# Patient Record
Sex: MALE | Race: WHITE | NOT HISPANIC OR LATINO | Employment: PART TIME | URBAN - METROPOLITAN AREA
[De-identification: names, ages, dates, MRNs, and addresses within clinical notes are randomized per-mention and may not be internally consistent; named-entity substitution may affect disease eponyms.]

---
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
## 2024-04-29 ENCOUNTER — OFFICE VISIT (OUTPATIENT)
Dept: PAIN MEDICINE | Facility: CLINIC | Age: 19
End: 2024-04-29
Payer: COMMERCIAL

## 2024-04-29 ENCOUNTER — HOSPITAL ENCOUNTER (OUTPATIENT)
Dept: RADIOLOGY | Facility: HOSPITAL | Age: 19
Discharge: HOME | End: 2024-04-29
Attending: PHYSICAL MEDICINE & REHABILITATION
Payer: COMMERCIAL

## 2024-04-29 VITALS
HEART RATE: 86 BPM | WEIGHT: 228.81 LBS | RESPIRATION RATE: 18 BRPM | BODY MASS INDEX: 34.68 KG/M2 | HEIGHT: 68 IN | DIASTOLIC BLOOD PRESSURE: 67 MMHG | SYSTOLIC BLOOD PRESSURE: 102 MMHG

## 2024-04-29 DIAGNOSIS — M79.12 MYALGIA OF AUXILIARY MUSCLES, HEAD AND NECK: Primary | ICD-10-CM

## 2024-04-29 DIAGNOSIS — M79.12 MYALGIA OF AUXILIARY MUSCLES, HEAD AND NECK: ICD-10-CM

## 2024-04-29 PROCEDURE — 1159F MED LIST DOCD IN RCRD: CPT | Mod: CPTII,S$GLB,, | Performed by: PHYSICAL MEDICINE & REHABILITATION

## 2024-04-29 PROCEDURE — 99999 PR PBB SHADOW E&M-EST. PATIENT-LVL IV: CPT | Mod: PBBFAC,,, | Performed by: PHYSICAL MEDICINE & REHABILITATION

## 2024-04-29 PROCEDURE — 3074F SYST BP LT 130 MM HG: CPT | Mod: CPTII,S$GLB,, | Performed by: PHYSICAL MEDICINE & REHABILITATION

## 2024-04-29 PROCEDURE — 99203 OFFICE O/P NEW LOW 30 MIN: CPT | Mod: S$GLB,,, | Performed by: PHYSICAL MEDICINE & REHABILITATION

## 2024-04-29 PROCEDURE — 3008F BODY MASS INDEX DOCD: CPT | Mod: CPTII,S$GLB,, | Performed by: PHYSICAL MEDICINE & REHABILITATION

## 2024-04-29 PROCEDURE — 72052 X-RAY EXAM NECK SPINE 6/>VWS: CPT | Mod: 26,,, | Performed by: RADIOLOGY

## 2024-04-29 PROCEDURE — 3078F DIAST BP <80 MM HG: CPT | Mod: CPTII,S$GLB,, | Performed by: PHYSICAL MEDICINE & REHABILITATION

## 2024-04-29 PROCEDURE — 72052 X-RAY EXAM NECK SPINE 6/>VWS: CPT | Mod: TC

## 2024-04-29 NOTE — PROGRESS NOTES
New Patient Chronic Pain Note (Initial Visit)    Referring Physician: Drea Arora    PCP: TERRELL Chaves MD    Chief Complaint: No chief complaint on file.       SUBJECTIVE:    Nahum Alfaro is a 18 y.o. male who presents to the clinic for the evaluation of neck pain.  He is self referred.  He is past medical history of autism depression ADHD obesity ADDIE multiple other medical comorbidities as listed in his chart.  The pain started 4-6 weeks ago without any injury or trauma and symptoms have been unchanged.The pain is located in the base of the cervical spine without any radiation.  The pain is described as  stiff  and is rated as 5/10. The pain is rated with a score of  5/10 on the BEST day and a score of 5/10 on the WORST day.  Symptoms interfere with daily activity. The pain is exacerbated by movement.  The pain is mitigated by nothing.  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    Patient denies night fever/night sweats, urinary incontinence, bowel incontinence, significant weight loss, significant motor weakness, and loss of sensations.    Pain Disability Index Review:          No data to display                Non-Pharmacologic Treatments:  Physical Therapy/Home Exercise: no  Ice/Heat:no  TENS: no  Acupuncture: no  Massage: no  Chiropractic: no    Other: no      Pain Medications:  - Opioids:  None recently  - Adjuvant Medications:  Wellbutrin, Paxil, ibuprofen  - Anti-Coagulants:  None     report:  Reviewed and consistent with medication use as prescribed.    Pain Procedures:   Denies      Imaging:   X-ray lumbar spine 11/03/2023:  The vertebral bodies demonstrate a normal height and alignment. The disc space heights are well maintained. No significant facet arthropathy suggested. Prominent stool noted throughout the right colon.         Past Medical History:   Diagnosis Date    ADHD (attention deficit hyperactivity disorder)     Anxiety     IBS (irritable bowel syndrome)      Past  Surgical History:   Procedure Laterality Date    TYMPANOSTOMY TUBE PLACEMENT Bilateral 2006     Social History     Socioeconomic History    Marital status: Single   Tobacco Use    Smoking status: Never     Passive exposure: Never    Smokeless tobacco: Never   Substance and Sexual Activity    Alcohol use: No    Drug use: No    Sexual activity: Not Currently     Partners: Female   Social History Narrative    4 cats, no smokers in household.     Family History   Problem Relation Name Age of Onset    No Known Problems Mother      No Known Problems Father      No Known Problems Brother         Review of patient's allergies indicates:   Allergen Reactions    Lactose Other (See Comments) and Rash     Abd pain.  Abd pain.  Abd pain.       Current Outpatient Medications   Medication Sig Dispense Refill    buPROPion (WELLBUTRIN XL) 300 MG 24 hr tablet Take 1 tablet every morning to help with depression.      cloNIDine (CATAPRES) 0.3 MG tablet Take 0.3 mg by mouth every evening.      doxycycline (VIBRA-TABS) 100 MG tablet Take 1 tablet (100 mg total) by mouth once daily. 90 tablet 0    glycopyrrolate (ROBINUL) 2 MG Tab Take 1 tablet (2 mg total) by mouth every evening. 30 tablet 5    linaCLOtide (LINZESS) 72 mcg Cap capsule Take 1 capsule (72 mcg total) by mouth before breakfast. 90 capsule 4    metFORMIN (GLUCOPHAGE-XR) 500 MG ER 24hr tablet Take 1 tablet (500 mg total) by mouth once daily for 14 days, THEN 1 tablet (500 mg total) 2 (two) times daily with meals for 14 days. 60 tablet 2    methylphenidate HCl 54 MG CR tablet Take 54 mg by mouth every morning.      mupirocin (BACTROBAN) 2 % ointment Apply topically 2 (two) times daily. 22 g 0    paroxetine (PAXIL-CR) 25 MG 24 hr tablet Take 1 tablet every evening at bedtime to help with anxiety.      sildenafiL (VIAGRA) 50 MG tablet Take 1/2 to 1 tab by mouth 30min prior sex 30 tablet 2     No current facility-administered medications for this visit.       Review of Systems      GENERAL:  No weight loss, malaise or fevers.  HEENT:   No recent changes in vision or hearing  NECK:  Negative for lumps, no difficulty with swallowing.  RESPIRATORY:  Negative for cough, wheezing or shortness of breath, patient denies any recent URI.  CARDIOVASCULAR:  Negative for chest pain, leg swelling or palpitations.  GI:  Negative for abdominal discomfort, blood in stools or black stools or change in bowel habits.  MUSCULOSKELETAL:  See HPI.  SKIN:  Negative for lesions, rash, and itching.  PSYCH:  No mood disorder or recent psychosocial stressors.  Patients sleep is not disturbed secondary to pain.  HEMATOLOGY/LYMPHOLOGY:  Negative for prolonged bleeding, bruising easily or swollen nodes.  Patient is not currently taking any anti-coagulants  NEURO:   No history of headaches, syncope, paralysis, seizures or tremors.  All other reviewed and negative other than HPI.    OBJECTIVE:    There were no vitals taken for this visit.        Physical Exam    GENERAL: Well appearing, in no acute distress, alert and oriented x3.  PSYCH:  Mood and affect appropriate.  SKIN: Skin color, texture, turgor normal, no rashes or lesions.  HEAD/FACE:  Normocephalic, atraumatic. Cranial nerves grossly intact.  NECK: No pain to palpation over the cervical paraspinous muscles. Spurling Negative.  Minimal pain with neck flexion, extension, and lateral flexion.   CV: RRR with palpation of the radial artery.  PULM: No evidence of respiratory difficulty, symmetric chest rise.  GI:  Soft and non-tender.  BACK:  Kyphotic posture.  No pain to palpation over the facet joints of the lumbar spine or spinous processes. Normal range of motion without pain reproduction.  EXTREMITIES:  No deformities, edema, or skin discoloration. Good capillary refill.  MUSCULOSKELETAL: Shoulder provocative maneuvers are negative.  Bilateral upper and lower extremity strength is normal and symmetric.  No atrophy or tone abnormalities are noted.  NEURO:  Bilateral upper and lower extremity coordination and muscle stretch reflexes are physiologic and symmetric.  Plantar response are downgoing. No clonus.  Negative Dragan.  No loss of sensation is noted.  GAIT: normal.      LABS:  Lab Results   Component Value Date    WBC 6.52 09/29/2020    HGB 13.5 09/29/2020    HCT 43.1 09/29/2020    MCV 94 09/29/2020     09/29/2020       CMP  Sodium   Date Value Ref Range Status   04/28/2023 138 136 - 145 mmol/L Final     Potassium   Date Value Ref Range Status   04/28/2023 4.2 3.5 - 5.1 mmol/L Final     Chloride   Date Value Ref Range Status   04/28/2023 105 95 - 110 mmol/L Final     CO2   Date Value Ref Range Status   04/28/2023 24 23 - 29 mmol/L Final     Glucose   Date Value Ref Range Status   04/28/2023 82 70 - 110 mg/dL Final     BUN   Date Value Ref Range Status   04/28/2023 10 6 - 20 mg/dL Final     Creatinine   Date Value Ref Range Status   04/28/2023 0.7 0.5 - 1.4 mg/dL Final     Calcium   Date Value Ref Range Status   04/28/2023 9.3 8.7 - 10.5 mg/dL Final     Total Protein   Date Value Ref Range Status   04/28/2023 7.4 6.0 - 8.4 g/dL Final     Albumin   Date Value Ref Range Status   04/28/2023 3.9 3.2 - 4.7 g/dL Final     Total Bilirubin   Date Value Ref Range Status   04/28/2023 0.2 0.1 - 1.0 mg/dL Final     Comment:     For infants and newborns, interpretation of results should be based  on gestational age, weight and in agreement with clinical  observations.    Premature Infant recommended reference ranges:  Up to 24 hours.............<8.0 mg/dL  Up to 48 hours............<12.0 mg/dL  3-5 days..................<15.0 mg/dL  6-29 days.................<15.0 mg/dL       Alkaline Phosphatase   Date Value Ref Range Status   04/28/2023 94 59 - 164 U/L Final     AST   Date Value Ref Range Status   04/28/2023 21 10 - 40 U/L Final     ALT   Date Value Ref Range Status   04/28/2023 24 10 - 44 U/L Final     Anion Gap   Date Value Ref Range Status   04/28/2023 9 8 - 16  mmol/L Final     eGFR if    Date Value Ref Range Status   01/20/2020 SEE COMMENT >60 mL/min/1.73 m^2 Final     eGFR if non    Date Value Ref Range Status   01/20/2020 SEE COMMENT >60 mL/min/1.73 m^2 Final     Comment:     Calculation used to obtain the estimated glomerular filtration  rate (eGFR) is the CKD-EPI equation.   Test not performed.  GFR calculation is only valid for patients   18 and older.         Lab Results   Component Value Date    HGBA1C 5.2 04/28/2023             ASSESSMENT: 18 y.o. year old male with neck pain, consistent with     No diagnosis found.      PLAN:   - Interventions:  None at this time    - Anticoagulation use:  None    - Medications: I have stressed the importance of physical activity and a home exercise plan to help with pain and improve health. and Patient can continue with medications for now since they are providing benefits, using them appropriately, and without side effects.         - Therapy:  Likely will place referral to Physical therapy for Cervical ROM, stretching, strengthening, and a home exercise program.  Will determine after x-ray of cervical spine    - Labs:  Reviewed    - Imaging: Reviewed available imaging with patient and answered any questions they had regarding study.Order Flexion-Extension X-rays of the cervical spine to rule out any instability.    - Consults/Referrals:  None at this time, likely physical therapy    - Records:  Reviewed/Obtain old records from outside physicians and imaging    - Follow up visit: return to clinic as needed    - Counseled patient regarding the importance of activity modification and physical therapy    - This condition does not require this patient to take time off of work, and the primary goal of our Pain Management services is to improve the patient's functional capacity.    - Patient Questions: Answered all of the patient's questions regarding diagnosis, therapy, and treatment        The above  plan and management options were discussed at length with patient. Patient is in agreement with the above and verbalized understanding.    I discussed the goals of interventional chronic pain management with the patient on today's visit.  I explained the utility of injections for diagnostic and therapeutic purposes.  We discussed a multimodal approach to pain including treating the patient's given worst pain at any given time.  We will use a systematic approach to addressing pain.  We will also adopt a multimodal approach that includes injections, adjuvant medications, physical therapy, at times psychiatry.  There may be a limited role for opioid use intermittently in the treatment of pain, more particularly for acute pain although no one approach can be used as a sole treatment modality.    I emphasized the importance of regular exercise, core strengthening and stretching, diet and weight loss as a cornerstone of long-term pain management.      Dalton Velarde MD  Interventional Pain Management  [facility]    Disclaimer:  This note was prepared using voice recognition system and is likely to have sound alike errors that may have been overlooked even after proof reading.  Please call me with any questions

## 2024-04-30 ENCOUNTER — PATIENT MESSAGE (OUTPATIENT)
Dept: PAIN MEDICINE | Facility: CLINIC | Age: 19
End: 2024-04-30
Payer: COMMERCIAL

## 2024-05-17 ENCOUNTER — OFFICE VISIT (OUTPATIENT)
Dept: INTERNAL MEDICINE | Facility: CLINIC | Age: 19
End: 2024-05-17
Payer: COMMERCIAL

## 2024-05-17 VITALS
WEIGHT: 228.63 LBS | DIASTOLIC BLOOD PRESSURE: 60 MMHG | RESPIRATION RATE: 18 BRPM | HEART RATE: 113 BPM | TEMPERATURE: 98 F | OXYGEN SATURATION: 98 % | HEIGHT: 68 IN | BODY MASS INDEX: 34.65 KG/M2 | SYSTOLIC BLOOD PRESSURE: 110 MMHG

## 2024-05-17 DIAGNOSIS — Z13.220 ENCOUNTER FOR LIPID SCREENING FOR CARDIOVASCULAR DISEASE: ICD-10-CM

## 2024-05-17 DIAGNOSIS — E66.09 CLASS 1 OBESITY DUE TO EXCESS CALORIES WITH SERIOUS COMORBIDITY AND BODY MASS INDEX (BMI) OF 34.0 TO 34.9 IN ADULT: Primary | Chronic | ICD-10-CM

## 2024-05-17 DIAGNOSIS — Z13.6 ENCOUNTER FOR LIPID SCREENING FOR CARDIOVASCULAR DISEASE: ICD-10-CM

## 2024-05-17 DIAGNOSIS — E88.819 INSULIN RESISTANCE: Chronic | ICD-10-CM

## 2024-05-17 PROCEDURE — 1160F RVW MEDS BY RX/DR IN RCRD: CPT | Mod: CPTII,S$GLB,, | Performed by: FAMILY MEDICINE

## 2024-05-17 PROCEDURE — 1159F MED LIST DOCD IN RCRD: CPT | Mod: CPTII,S$GLB,, | Performed by: FAMILY MEDICINE

## 2024-05-17 PROCEDURE — 3078F DIAST BP <80 MM HG: CPT | Mod: CPTII,S$GLB,, | Performed by: FAMILY MEDICINE

## 2024-05-17 PROCEDURE — 99214 OFFICE O/P EST MOD 30 MIN: CPT | Mod: S$GLB,,, | Performed by: FAMILY MEDICINE

## 2024-05-17 PROCEDURE — 3008F BODY MASS INDEX DOCD: CPT | Mod: CPTII,S$GLB,, | Performed by: FAMILY MEDICINE

## 2024-05-17 PROCEDURE — 99999 PR PBB SHADOW E&M-EST. PATIENT-LVL III: CPT | Mod: PBBFAC,,, | Performed by: FAMILY MEDICINE

## 2024-05-17 PROCEDURE — 3074F SYST BP LT 130 MM HG: CPT | Mod: CPTII,S$GLB,, | Performed by: FAMILY MEDICINE

## 2024-05-17 PROCEDURE — G2211 COMPLEX E/M VISIT ADD ON: HCPCS | Mod: S$GLB,,, | Performed by: FAMILY MEDICINE

## 2024-05-17 RX ORDER — METFORMIN HYDROCHLORIDE 500 MG/1
1000 TABLET, EXTENDED RELEASE ORAL 2 TIMES DAILY
Qty: 360 TABLET | Refills: 2 | Status: SHIPPED | OUTPATIENT
Start: 2024-05-17

## 2024-05-17 NOTE — ASSESSMENT & PLAN NOTE
"Wt Readings from Last 6 Encounters:   05/17/24 103.7 kg (228 lb 9.9 oz) (98%, Z= 2.01)*   04/29/24 103.8 kg (228 lb 13.4 oz) (98%, Z= 2.02)*   12/15/23 103.8 kg (228 lb 13.4 oz) (98%, Z= 2.05)*   12/08/23 103.6 kg (228 lb 4.8 oz) (98%, Z= 2.04)*   11/03/23 99.8 kg (220 lb) (97%, Z= 1.88)*   09/08/23 97.1 kg (214 lb) (96%, Z= 1.77)*     * Growth percentiles are based on CDC (Boys, 2-20 Years) data.     Estimated body mass index is 34.76 kg/m² as calculated from the following:    Height as of this encounter: 5' 8" (1.727 m).    Weight as of this encounter: 103.7 kg (228 lb 9.9 oz).    "

## 2024-05-17 NOTE — PROGRESS NOTES
OFFICE VISIT 5/17/24 10:40 AM CDT    History of Present Illness    Nahum presents today for regular checkup and to discuss weight concerns.    Nahum is frustrated at the stagnation in his weight, despite increased physical activity at work which includes going up and down stairs and manually handling tasks. His dietary habits have remained the same.    The patient acknowledges adherence to his current medications including Metformin, administered twice daily for metabolism, without any reported side effects. However, he expresses a preference for his previous ADHD medication, methylphenidate, over his current medication, ACY, deeming the previous one stronger. He plans to discuss about the formulations of methylphenidate with his pharmacist.    Nahum voiced concerns about a potential inherited insulin resistance from his mother who had diabetes, and its possible impact on his weight gain. He showed interest in undergoing a glucose tolerance test.    A recent home sleep test conducted revealed no significant indications of obstructive sleep apnea.       Review of Systems   Respiratory:  Negative for chest tightness and shortness of breath.    Cardiovascular:  Negative for chest pain.   Endocrine: Negative for polydipsia and polyuria.       Assessment & Plan     Increased the patient's Metformin dosage from 1-2 pills twice daily to improve metabolism and weight management.   Explained the function of Metformin and its role in metabolism.   Educated the patient about the body's metabolic rate and how it can adjust to match caloric intake, making weight loss challenging.   Considered taking over the patient's psychiatric medication management, pending review of the patient's psychiatric progress notes.   Requested the patient to provide the last 2 psychiatric progress notes for review.   Ordered a glucose tolerance test to check for insulin resistance, considering the patient's family history of diabetes.   Explained  "the process and purpose of this test.   Prescribed a cholesterol panel and a comprehensive metabolic panel to monitor the patient's overall health.   Advised the patient to get fasting labs done soon.   Scheduled a virtual visit in July, following the patient's appointment with the psychiatrist in June.       1. Class 1 obesity due to excess calories with serious comorbidity and body mass index (BMI) of 34.0 to 34.9 in adult  Comments:  goal 5-10% tbw   pt does not like zero sugar drinks; will change to 1 soda/day instead of 2  muscle mass lower; res train 2x/wk 20 min  Assessment & Plan:  Wt Readings from Last 6 Encounters:   05/17/24 103.7 kg (228 lb 9.9 oz) (98%, Z= 2.01)*   04/29/24 103.8 kg (228 lb 13.4 oz) (98%, Z= 2.02)*   12/15/23 103.8 kg (228 lb 13.4 oz) (98%, Z= 2.05)*   12/08/23 103.6 kg (228 lb 4.8 oz) (98%, Z= 2.04)*   11/03/23 99.8 kg (220 lb) (97%, Z= 1.88)*   09/08/23 97.1 kg (214 lb) (96%, Z= 1.77)*     * Growth percentiles are based on CDC (Boys, 2-20 Years) data.     Estimated body mass index is 34.76 kg/m² as calculated from the following:    Height as of this encounter: 5' 8" (1.727 m).    Weight as of this encounter: 103.7 kg (228 lb 9.9 oz).      Orders:  -     metFORMIN (GLUCOPHAGE-XR) 500 MG ER 24hr tablet; Take 2 tablets (1,000 mg total) by mouth 2 (two) times a day.  Dispense: 360 tablet; Refill: 2  -     Comprehensive Metabolic Panel; Future; Expected date: 05/17/2024    2. Insulin resistance  Comments:  trial metformin  Overview:  trial metformin    Orders:  -     metFORMIN (GLUCOPHAGE-XR) 500 MG ER 24hr tablet; Take 2 tablets (1,000 mg total) by mouth 2 (two) times a day.  Dispense: 360 tablet; Refill: 2  -     Glucose Tolerance 2 Hour; Future; Expected date: 05/17/2024  -     Insulin, Random; Future; Expected date: 05/17/2024    3. Encounter for lipid screening for cardiovascular disease  -     Lipid Panel; Future; Expected date: 05/17/2024    No other significant complaints or " "concerns were reported.    Today's visit involved the intricate management of episodic problem(s) and the ongoing care for the patient's serious or complex condition(s) listed above, reflecting the inherent complexity of providing longitudinal, comprehensive evaluation and management as the central hub for the patient's primary care services.  Vitals:    05/17/24 1044   BP: 110/60   BP Location: Left arm   Patient Position: Sitting   BP Method: Large (Manual)   Pulse: (!) 113   Resp: 18   Temp: 98 °F (36.7 °C)   SpO2: 98%   Weight: 103.7 kg (228 lb 9.9 oz)   Height: 5' 8" (1.727 m)   Physical Exam  Vitals reviewed.   Constitutional:       General: He is not in acute distress.     Appearance: Normal appearance. He is not ill-appearing, toxic-appearing or diaphoretic.   Cardiovascular:      Rate and Rhythm: Normal rate and regular rhythm.   Pulmonary:      Effort: Pulmonary effort is normal.   Neurological:      Mental Status: He is alert and oriented to person, place, and time. Mental status is at baseline.   Psychiatric:         Mood and Affect: Mood normal.         Behavior: Behavior normal.         Judgment: Judgment normal.       This note was generated with the assistance of ambient listening technology. Verbal consent was obtained by the patient and accompanying visitor(s) for the recording of patient appointment to facilitate this note. I attest to having reviewed and edited the generated note for accuracy, though some syntax or spelling errors may persist. Please contact the author of this note for any clarification.    Documentation entered by me for this encounter may have been done in part using speech-recognition technology. Although I have made an effort to ensure accuracy, "sound like" errors may exist and should be interpreted in context.   "

## 2024-05-21 ENCOUNTER — OFFICE VISIT (OUTPATIENT)
Dept: RHEUMATOLOGY | Facility: CLINIC | Age: 19
End: 2024-05-21
Payer: COMMERCIAL

## 2024-05-21 VITALS
HEIGHT: 68 IN | WEIGHT: 226.88 LBS | HEART RATE: 83 BPM | BODY MASS INDEX: 34.38 KG/M2 | SYSTOLIC BLOOD PRESSURE: 113 MMHG | DIASTOLIC BLOOD PRESSURE: 76 MMHG

## 2024-05-21 DIAGNOSIS — M25.631 BILATERAL STIFFNESS OF WRIST JOINTS: ICD-10-CM

## 2024-05-21 DIAGNOSIS — M25.632 BILATERAL STIFFNESS OF WRIST JOINTS: ICD-10-CM

## 2024-05-21 DIAGNOSIS — M25.60 GENERALIZED STIFFNESS: Primary | ICD-10-CM

## 2024-05-21 PROCEDURE — 3074F SYST BP LT 130 MM HG: CPT | Mod: CPTII,S$GLB,, | Performed by: PHYSICIAN ASSISTANT

## 2024-05-21 PROCEDURE — 99999 PR PBB SHADOW E&M-EST. PATIENT-LVL IV: CPT | Mod: PBBFAC,,, | Performed by: PHYSICIAN ASSISTANT

## 2024-05-21 PROCEDURE — 3008F BODY MASS INDEX DOCD: CPT | Mod: CPTII,S$GLB,, | Performed by: PHYSICIAN ASSISTANT

## 2024-05-21 PROCEDURE — 1159F MED LIST DOCD IN RCRD: CPT | Mod: CPTII,S$GLB,, | Performed by: PHYSICIAN ASSISTANT

## 2024-05-21 PROCEDURE — 3078F DIAST BP <80 MM HG: CPT | Mod: CPTII,S$GLB,, | Performed by: PHYSICIAN ASSISTANT

## 2024-05-21 PROCEDURE — 99214 OFFICE O/P EST MOD 30 MIN: CPT | Mod: S$GLB,,, | Performed by: PHYSICIAN ASSISTANT

## 2024-05-21 PROCEDURE — 1160F RVW MEDS BY RX/DR IN RCRD: CPT | Mod: CPTII,S$GLB,, | Performed by: PHYSICIAN ASSISTANT

## 2024-05-21 RX ORDER — PANTOPRAZOLE SODIUM 20 MG/1
20 TABLET, DELAYED RELEASE ORAL DAILY
Qty: 90 TABLET | Refills: 3 | Status: SHIPPED | OUTPATIENT
Start: 2024-05-21 | End: 2025-05-21

## 2024-05-21 RX ORDER — MELOXICAM 15 MG/1
15 TABLET ORAL DAILY
Qty: 30 TABLET | Refills: 1 | Status: SHIPPED | OUTPATIENT
Start: 2024-05-21

## 2024-05-21 NOTE — PROGRESS NOTES
Subjective:      Patient ID: Nahum Alfaro is a 18 y.o. male.    Chief Complaint: body stiffness    HPI   Nahum Alfaro  is a 18 y.o. male seen today for rheumatologic follow-up.  He has previously seen 1 of my colleagues for complaint of whole-body stiffness.  He had autoimmune workup done December 2022 which was essentially unrevealing.  Patient here with a family friend and his mom is on the phone available during the visit as well.  He is done multiple rounds of physical therapy over this last for years without any significant relief and muscle stiffness.  He states it is functionally limiting him.  It is progressively worsening as well.      He has severe loss of extension bilateral wrists.  He can not even lay flat.  Even crossing his fingers or trying to squat is difficult.  It is gotten progressively worse over the last 3 or 4 years.  Does not complain of any significant pain.  He also complains of tight hamstrings.  Gets a rash on his lower legs.  He was put on doxycycline for folliculitis per Dr. Black and that has improved tremendously.    In past he had calprotectin checked in [place].  That was strongly positive.  At that time he was also diagnosed with salmonella poisoning.  Stool cultures were positive.  Mom's states that symptoms started to deteriorate after that infection.  From a GI standpoint he complains of chronic GI symptoms including constipation, abdominal cramping and pain.    Patient denies fevers, chills, photosensitivity, eye pain, shortness of breath, chest pain, hematuria, blood in the stool, rash, sicca symptoms, raynauds, digital/oral/nasal ulcerations, lad, progressive weakness.  Rheumatologic systems otherwise negative.    Serologies/Labs:  Neg NICK, CCP, RF  +Calprotectin 9/2020 ([place] - stool cx +salmonella)  Current Treatment:  Na   Previous Treatment:   Physical therapy  Stretching programs    Current Outpatient Medications:     buPROPion (WELLBUTRIN XL) 300 MG 24 hr  tablet, Take 1 tablet every morning to help with depression., Disp: , Rfl:     cloNIDine (CATAPRES) 0.3 MG tablet, Take 0.3 mg by mouth every evening., Disp: , Rfl:     metFORMIN (GLUCOPHAGE-XR) 500 MG ER 24hr tablet, Take 2 tablets (1,000 mg total) by mouth 2 (two) times a day., Disp: 360 tablet, Rfl: 2    methylphenidate HCl 54 MG CR tablet, Take 54 mg by mouth every morning., Disp: , Rfl:     meloxicam (MOBIC) 15 MG tablet, Take 1 tablet (15 mg total) by mouth once daily., Disp: 30 tablet, Rfl: 1    pantoprazole (PROTONIX) 20 MG tablet, Take 1 tablet (20 mg total) by mouth once daily., Disp: 90 tablet, Rfl: 3    Past Medical History:   Diagnosis Date    ADHD (attention deficit hyperactivity disorder)     Anxiety     IBS (irritable bowel syndrome)      Family History   Problem Relation Name Age of Onset    No Known Problems Mother      No Known Problems Father      No Known Problems Brother       Social History     Socioeconomic History    Marital status: Single   Tobacco Use    Smoking status: Never     Passive exposure: Never    Smokeless tobacco: Never   Substance and Sexual Activity    Alcohol use: No    Drug use: No    Sexual activity: Not Currently     Partners: Female   Social History Narrative    4 cats, no smokers in household.     Social Determinants of Health     Financial Resource Strain: Low Risk  (5/16/2024)    Overall Financial Resource Strain (CARDIA)     Difficulty of Paying Living Expenses: Not hard at all   Food Insecurity: No Food Insecurity (5/16/2024)    Hunger Vital Sign     Worried About Running Out of Food in the Last Year: Never true     Ran Out of Food in the Last Year: Never true   Physical Activity: Insufficiently Active (5/16/2024)    Exercise Vital Sign     Days of Exercise per Week: 3 days     Minutes of Exercise per Session: 30 min   Stress: No Stress Concern Present (5/16/2024)    [employer] - Occupational Stress Questionnaire     Feeling of Stress :  "Not at all   Housing Stability: Unknown (5/16/2024)    Housing Stability Vital Sign     Unable to Pay for Housing in the Last Year: No     Review of patient's allergies indicates:   Allergen Reactions    Lactose Other (See Comments) and Rash     Abd pain.  Abd pain.  Abd pain.       Objective:   /76   Pulse 83   Ht 5' 8" (1.727 m)   Wt 102.9 kg (226 lb 13.7 oz)   BMI 34.49 kg/m²   Immunization History   Administered Date(s) Administered    COVID-19, MRNA, LN-S, PF (Pfizer) (Purple Cap) 05/24/2021, 06/14/2021, 12/22/2021, 12/22/2021    DTaP 2004, 2004, 07/11/2005, 12/17/2009    DTaP / HiB 08/29/2006    HPV 9-Valent 10/27/2015, 03/30/2016    HPV Quadrivalent 08/18/2015    Hepatitis A / Hepatitis B 04/28/2023    Hepatitis A, Pediatric/Adolescent, 2 Dose 03/29/2021    Hepatitis B 2004, 2004, 07/11/2005    Hepatitis B, Adult 2004, 2004, 07/11/2005    HiB PRP-T 2004, 07/11/2005    IPV 2004, 10/13/2005, 08/29/2006, 12/17/2009    Influenza 09/29/2020    Influenza (Flumist) - Quadrivalent - Intranasal *Preferred* (2-49 years old) 10/22/2013, 11/24/2014, 10/27/2015    Influenza - Intranasal 09/04/2009, 10/12/2010    Influenza - Intranasal - Trivalent 09/04/2009, 10/12/2010    Influenza - Quadrivalent 10/03/2016, 11/20/2017    Influenza - Quadrivalent - MDCK - PF 09/29/2020, 10/18/2022    Influenza - Quadrivalent - PF (6-35 months) 10/13/2005    Influenza - Quadrivalent - PF *Preferred* (6 months and older) 10/13/2005, 10/03/2016, 11/20/2017, 10/30/2018, 11/22/2019, 10/27/2021    Influenza - Trivalent (ADULT) 10/13/2005    Influenza - Trivalent - PF (ADULT) 10/13/2005, 09/04/2009, 10/12/2010    MMR 10/23/2007, 12/17/2009    Meningococcal Conjugate (MCV4P) 08/18/2015, 03/29/2021    Pneumococcal Conjugate - 7 Valent 2004, 07/11/2005, 10/13/2005, 08/29/2006    Tdap 08/18/2015, 03/29/2021    Varicella 10/23/2007, 12/17/2009       Physical Exam   Constitutional: He is " "oriented to person, place, and time. No distress.   HENT:   Head: Normocephalic and atraumatic.   Pulmonary/Chest: Effort normal.   Musculoskeletal:         General: No swelling or tenderness. Normal range of motion.   Neurological: He is alert and oriented to person, place, and time.   Skin: Skin is dry. Rash noted.   Psychiatric: His behavior is normal. Mood normal.   Nursing note and vitals reviewed.    No synovitis, no dactylitis, no enthesitis  No effusions of large or small joints  100% fist formation    Significant decrease wrist est bilaterally - only about 30 degrees  Tight hamstrings bilaterally  5/5 strength hips/shoulders    No results found for this or any previous visit (from the past 672 hour(s)).    No results found for: "TBGOLDPLUS"   Lab Results   Component Value Date    HEPCAB Non-reactive 04/28/2023      Imaging  I have personally reviewed images and reports as below.  I agree with the interpretation.  MRI Hand Wrist W WO Bilat_Rheumatoid  Details    Reading Physician Reading Date Result Priority   Narciso Menjivar MD  [phone] 12/31/2022 Routine     Narrative & Impression  EXAMINATION:  MRI HAND_WRIST W WO BILATERAL_RHEUMATOID ARTHRITIS     CLINICAL HISTORY:  Stiffness of right wrist, not elsewhere classified     TECHNIQUE:  Multiplanar multisequence imaging of the left hand and wrist and right hand and wrist was performed with and without the intravenous administration of 10 cc of Gadavist     COMPARISON:  Bilateral hand x-rays, 12/09/2022.     FINDINGS:  Left hand and wrist: No fracture or malalignment.  No osseous erosion.  No bone marrow edema or bone marrow enhancement.  No enhancing synovitis.  The tendons and ligaments of the wrist and hand are intact.  No soft tissue mass.  No inflammatory change in the soft tissues.     Right hand and wrist: No fracture or malalignment. No osseous erosion. No bone marrow edema or bone marrow enhancement. No enhancing synovitis. The tendons and " ligaments of the wrist and hand are intact. No soft tissue mass.  No inflammatory change in the soft tissues.     Impression:     Normal bilateral hand and wrist MRI.  No arthritic changes identified.        Electronically signed by:Narciso Menjivar MD  Date:                                            12/31/2022  Time:                                           10:50       Assessment:     1. Generalized stiffness    2. Bilateral stiffness of wrist joints        Plan:     Nahum was seen today for body stiffness.    Diagnoses and all orders for this visit:    Generalized stiffness  -     Ambulatory referral/consult to Rheumatology  -     Calprotectin, Stool; Future  -     HLA B27 Antigen; Future  -     CBC Auto Differential; Standing  -     Comprehensive Metabolic Panel; Standing  -     NICK Screen w/Reflex; Future  -     Sedimentation rate; Standing  -     C-Reactive Protein; Standing  -     Anti-Smith Antibody; Future  -     Anti Sm/RNP Antibody; Future  -     ANTI -SSA ANTIBODY; Future  -     Sjogrens syndrome-B extractable nuclear antibody; Future  -     ANTI-DNA ANTIBODY, DOUBLE-STRANDED; Future  -     meloxicam (MOBIC) 15 MG tablet; Take 1 tablet (15 mg total) by mouth once daily.  -     pantoprazole (PROTONIX) 20 MG tablet; Take 1 tablet (20 mg total) by mouth once daily.    Bilateral stiffness of wrist joints    Generalized stiffness  MRI hands/wirsts unrevealing   AI w/u unrevealing  Check ESR, CRP, NICK, MAMADOU panel today - requesting to do later this week w fasting labs  Check HLA B27 today as well  Neuro eval to r/o central cause for stiffness  Mobic + Protonix as above  All precautions advised  DC if any s/e  Constipation and abd cramping  Check Calprotectin today  Drug therapy requiring intensive monitoring for toxicity  High Risk Medication Monitoring encounter  No current medication related issues, no evidence of toxicity  I ordered labs for toxicity monitoring, have personally reviewed the findings, and  discussed them with the patient.  Pending labs will be sent via the portal  Return to clinic: 1-2 mos for lab review    No follow-ups on file.    The patient understands, chooses and consents to this plan and accepts all the risks which include but are not limited to the risks mentioned above.     Disclaimer: This note was prepared using a voice recognition system and is likely to have sound alike errors within the text.

## 2024-06-07 ENCOUNTER — LAB VISIT (OUTPATIENT)
Dept: LAB | Facility: HOSPITAL | Age: 19
End: 2024-06-07
Attending: FAMILY MEDICINE
Payer: COMMERCIAL

## 2024-06-07 DIAGNOSIS — Z13.220 ENCOUNTER FOR LIPID SCREENING FOR CARDIOVASCULAR DISEASE: ICD-10-CM

## 2024-06-07 DIAGNOSIS — Z13.6 ENCOUNTER FOR LIPID SCREENING FOR CARDIOVASCULAR DISEASE: ICD-10-CM

## 2024-06-07 DIAGNOSIS — M25.60 GENERALIZED STIFFNESS: ICD-10-CM

## 2024-06-07 DIAGNOSIS — E66.09 CLASS 1 OBESITY DUE TO EXCESS CALORIES WITH SERIOUS COMORBIDITY AND BODY MASS INDEX (BMI) OF 34.0 TO 34.9 IN ADULT: Chronic | ICD-10-CM

## 2024-06-07 DIAGNOSIS — E88.819 INSULIN RESISTANCE: Chronic | ICD-10-CM

## 2024-06-07 LAB
ALBUMIN SERPL BCP-MCNC: 3.6 G/DL (ref 3.5–5.2)
ALBUMIN SERPL BCP-MCNC: 3.6 G/DL (ref 3.5–5.2)
ALP SERPL-CCNC: 93 U/L (ref 55–135)
ALP SERPL-CCNC: 93 U/L (ref 55–135)
ALT SERPL W/O P-5'-P-CCNC: 31 U/L (ref 10–44)
ALT SERPL W/O P-5'-P-CCNC: 31 U/L (ref 10–44)
ANION GAP SERPL CALC-SCNC: 8 MMOL/L (ref 8–16)
ANION GAP SERPL CALC-SCNC: 8 MMOL/L (ref 8–16)
AST SERPL-CCNC: 18 U/L (ref 10–40)
AST SERPL-CCNC: 18 U/L (ref 10–40)
BASOPHILS # BLD AUTO: 0.07 K/UL (ref 0–0.2)
BASOPHILS NFR BLD: 0.8 % (ref 0–1.9)
BILIRUB SERPL-MCNC: 0.4 MG/DL (ref 0.1–1)
BILIRUB SERPL-MCNC: 0.4 MG/DL (ref 0.1–1)
BUN SERPL-MCNC: 10 MG/DL (ref 6–20)
BUN SERPL-MCNC: 10 MG/DL (ref 6–20)
CALCIUM SERPL-MCNC: 9.1 MG/DL (ref 8.7–10.5)
CALCIUM SERPL-MCNC: 9.1 MG/DL (ref 8.7–10.5)
CHLORIDE SERPL-SCNC: 105 MMOL/L (ref 95–110)
CHLORIDE SERPL-SCNC: 105 MMOL/L (ref 95–110)
CHOLEST SERPL-MCNC: 178 MG/DL (ref 120–199)
CHOLEST/HDLC SERPL: 5.1 {RATIO} (ref 2–5)
CO2 SERPL-SCNC: 25 MMOL/L (ref 23–29)
CO2 SERPL-SCNC: 25 MMOL/L (ref 23–29)
CREAT SERPL-MCNC: 0.7 MG/DL (ref 0.5–1.4)
CREAT SERPL-MCNC: 0.7 MG/DL (ref 0.5–1.4)
CRP SERPL-MCNC: 14.3 MG/L (ref 0–8.2)
DIFFERENTIAL METHOD BLD: NORMAL
EOSINOPHIL # BLD AUTO: 0.1 K/UL (ref 0–0.5)
EOSINOPHIL NFR BLD: 0.7 % (ref 0–8)
ERYTHROCYTE [DISTWIDTH] IN BLOOD BY AUTOMATED COUNT: 13 % (ref 11.5–14.5)
ERYTHROCYTE [SEDIMENTATION RATE] IN BLOOD BY WESTERGREN METHOD: 17 MM/HR (ref 0–10)
EST. GFR  (NO RACE VARIABLE): >60 ML/MIN/1.73 M^2
EST. GFR  (NO RACE VARIABLE): >60 ML/MIN/1.73 M^2
GLUCOSE SERPL-MCNC: 101 MG/DL (ref 70–110)
GLUCOSE SERPL-MCNC: 101 MG/DL (ref 70–110)
GLUCOSE SERPL-MCNC: 106 MG/DL (ref 70–110)
GLUCOSE SERPL-MCNC: 120 MG/DL
GLUCOSE SERPL-MCNC: 206 MG/DL
HCT VFR BLD AUTO: 42.1 % (ref 40–54)
HDLC SERPL-MCNC: 35 MG/DL (ref 40–75)
HDLC SERPL: 19.7 % (ref 20–50)
HGB BLD-MCNC: 14 G/DL (ref 14–18)
IMM GRANULOCYTES # BLD AUTO: 0.02 K/UL (ref 0–0.04)
IMM GRANULOCYTES NFR BLD AUTO: 0.2 % (ref 0–0.5)
INSULIN COLLECTION INTERVAL: ABNORMAL
INSULIN SERPL-ACNC: 363.8 UU/ML
LDLC SERPL CALC-MCNC: 132 MG/DL (ref 63–159)
LYMPHOCYTES # BLD AUTO: 2.7 K/UL (ref 1–4.8)
LYMPHOCYTES NFR BLD: 31 % (ref 18–48)
MCH RBC QN AUTO: 29.9 PG (ref 27–31)
MCHC RBC AUTO-ENTMCNC: 33.3 G/DL (ref 32–36)
MCV RBC AUTO: 90 FL (ref 82–98)
MONOCYTES # BLD AUTO: 0.5 K/UL (ref 0.3–1)
MONOCYTES NFR BLD: 5.9 % (ref 4–15)
NEUTROPHILS # BLD AUTO: 5.3 K/UL (ref 1.8–7.7)
NEUTROPHILS NFR BLD: 61.4 % (ref 38–73)
NONHDLC SERPL-MCNC: 143 MG/DL
NRBC BLD-RTO: 0 /100 WBC
PLATELET # BLD AUTO: 334 K/UL (ref 150–450)
PMV BLD AUTO: 9.4 FL (ref 9.2–12.9)
POTASSIUM SERPL-SCNC: 4 MMOL/L (ref 3.5–5.1)
POTASSIUM SERPL-SCNC: 4 MMOL/L (ref 3.5–5.1)
PROT SERPL-MCNC: 7.2 G/DL (ref 6–8.4)
PROT SERPL-MCNC: 7.2 G/DL (ref 6–8.4)
RBC # BLD AUTO: 4.69 M/UL (ref 4.6–6.2)
SODIUM SERPL-SCNC: 138 MMOL/L (ref 136–145)
SODIUM SERPL-SCNC: 138 MMOL/L (ref 136–145)
TRIGL SERPL-MCNC: 55 MG/DL (ref 30–150)
WBC # BLD AUTO: 8.68 K/UL (ref 3.9–12.7)

## 2024-06-07 PROCEDURE — 86235 NUCLEAR ANTIGEN ANTIBODY: CPT | Mod: 59 | Performed by: PHYSICIAN ASSISTANT

## 2024-06-07 PROCEDURE — 36415 COLL VENOUS BLD VENIPUNCTURE: CPT | Performed by: PHYSICIAN ASSISTANT

## 2024-06-07 PROCEDURE — 86038 ANTINUCLEAR ANTIBODIES: CPT | Performed by: PHYSICIAN ASSISTANT

## 2024-06-07 PROCEDURE — 80061 LIPID PANEL: CPT | Performed by: FAMILY MEDICINE

## 2024-06-07 PROCEDURE — 86235 NUCLEAR ANTIGEN ANTIBODY: CPT | Performed by: PHYSICIAN ASSISTANT

## 2024-06-07 PROCEDURE — 83525 ASSAY OF INSULIN: CPT | Performed by: FAMILY MEDICINE

## 2024-06-07 PROCEDURE — 80053 COMPREHEN METABOLIC PANEL: CPT | Performed by: FAMILY MEDICINE

## 2024-06-07 PROCEDURE — 36415 COLL VENOUS BLD VENIPUNCTURE: CPT | Performed by: FAMILY MEDICINE

## 2024-06-07 PROCEDURE — 82951 GLUCOSE TOLERANCE TEST (GTT): CPT | Performed by: FAMILY MEDICINE

## 2024-06-07 PROCEDURE — 85025 COMPLETE CBC W/AUTO DIFF WBC: CPT | Performed by: PHYSICIAN ASSISTANT

## 2024-06-07 PROCEDURE — 85651 RBC SED RATE NONAUTOMATED: CPT | Performed by: PHYSICIAN ASSISTANT

## 2024-06-07 PROCEDURE — 86225 DNA ANTIBODY NATIVE: CPT | Performed by: PHYSICIAN ASSISTANT

## 2024-06-07 PROCEDURE — 86140 C-REACTIVE PROTEIN: CPT | Performed by: PHYSICIAN ASSISTANT

## 2024-06-07 PROCEDURE — 83993 ASSAY FOR CALPROTECTIN FECAL: CPT | Performed by: PHYSICIAN ASSISTANT

## 2024-06-10 LAB
ANA SER QL IF: NORMAL
CALPROTECTIN STL-MCNT: 10.6 MCG/G
DSDNA AB SER-ACNC: NORMAL [IU]/ML

## 2024-06-11 ENCOUNTER — PATIENT MESSAGE (OUTPATIENT)
Dept: INTERNAL MEDICINE | Facility: CLINIC | Age: 19
End: 2024-06-11
Payer: COMMERCIAL

## 2024-06-11 ENCOUNTER — PATIENT MESSAGE (OUTPATIENT)
Dept: RHEUMATOLOGY | Facility: CLINIC | Age: 19
End: 2024-06-11
Payer: COMMERCIAL

## 2024-06-11 LAB
ANTI SM ANTIBODY: 0.08 RATIO (ref 0–0.99)
ANTI SM/RNP ANTIBODY: 0.08 RATIO (ref 0–0.99)
ANTI-SM INTERPRETATION: NEGATIVE
ANTI-SM/RNP INTERPRETATION: NEGATIVE
ANTI-SSA ANTIBODY: 0.07 RATIO (ref 0–0.99)
ANTI-SSA INTERPRETATION: NEGATIVE
ANTI-SSB ANTIBODY: 0.06 RATIO (ref 0–0.99)
ANTI-SSB INTERPRETATION: NEGATIVE

## 2024-06-18 NOTE — PROGRESS NOTES
Your results are consistent with prediabetes (insulin resistance). Will discuss your results in more detail at your upcoming appointment with me on Friday, July 12th at 7:20 AM.

## 2024-06-28 ENCOUNTER — OFFICE VISIT (OUTPATIENT)
Dept: RHEUMATOLOGY | Facility: CLINIC | Age: 19
End: 2024-06-28
Payer: COMMERCIAL

## 2024-06-28 ENCOUNTER — CLINICAL SUPPORT (OUTPATIENT)
Dept: NUTRITION | Facility: CLINIC | Age: 19
End: 2024-06-28
Payer: COMMERCIAL

## 2024-06-28 VITALS
WEIGHT: 234.13 LBS | HEART RATE: 83 BPM | BODY MASS INDEX: 35.48 KG/M2 | HEIGHT: 68 IN | DIASTOLIC BLOOD PRESSURE: 64 MMHG | SYSTOLIC BLOOD PRESSURE: 102 MMHG

## 2024-06-28 DIAGNOSIS — M25.60 GENERALIZED STIFFNESS: Primary | ICD-10-CM

## 2024-06-28 DIAGNOSIS — E88.819 INSULIN RESISTANCE: ICD-10-CM

## 2024-06-28 DIAGNOSIS — M25.632 BILATERAL STIFFNESS OF WRIST JOINTS: ICD-10-CM

## 2024-06-28 DIAGNOSIS — E66.09 CLASS 1 OBESITY DUE TO EXCESS CALORIES WITH SERIOUS COMORBIDITY AND BODY MASS INDEX (BMI) OF 34.0 TO 34.9 IN ADULT: ICD-10-CM

## 2024-06-28 DIAGNOSIS — M25.631 BILATERAL STIFFNESS OF WRIST JOINTS: ICD-10-CM

## 2024-06-28 PROCEDURE — 97802 MEDICAL NUTRITION INDIV IN: CPT | Mod: 95,,, | Performed by: DIETITIAN, REGISTERED

## 2024-06-28 PROCEDURE — 99999 PR PBB SHADOW E&M-EST. PATIENT-LVL III: CPT | Mod: PBBFAC,,, | Performed by: STUDENT IN AN ORGANIZED HEALTH CARE EDUCATION/TRAINING PROGRAM

## 2024-06-28 NOTE — PROGRESS NOTES
Subjective:      Patient ID: Nahum Alfaro is a 19 y.o. male.    Chief Complaint: generalized stiffness    HPI   Nahum Alfaro  is a 19 y.o. male seen today for rheumatologic follow-up.  He has previously seen 1 of my colleagues for complaint of whole-body stiffness.  He had autoimmune workup done December 2022 which was essentially unrevealing.  Patient here with a family friend and his mom is on the phone available during the visit as well.  He is done multiple rounds of physical therapy over this last for years without any significant relief and muscle stiffness.  He states it is functionally limiting him.  It is progressively worsening as well.      He has severe loss of extension bilateral wrists.  He can not even lay flat.  Even crossing his fingers or trying to squat is difficult.  It is gotten progressively worse over the last 3 or 4 years.  Does not complain of any significant pain.  He also complains of tight hamstrings.  Gets a rash on his lower legs.  He was put on doxycycline for folliculitis per Dr. Black and that has improved tremendously.    In past he had calprotectin checked in [place].  That was strongly positive.  At that time he was also diagnosed with salmonella poisoning.  Stool cultures were positive.  Mom's states that symptoms started to deteriorate after that infection.  From a GI standpoint he complains of chronic GI symptoms including constipation, abdominal cramping and pain.    Patient denies fevers, chills, photosensitivity, eye pain, shortness of breath, chest pain, hematuria, blood in the stool, rash, sicca symptoms, raynauds, digital/oral/nasal ulcerations, lad, progressive weakness.  Rheumatologic systems otherwise negative.    Update:  Here to follow-up on labs.  Will be seeing Neuro in July.  Has gained weight.    Serologies/Labs:  Neg NICK, CCP, RF  +Calprotectin 9/2020 ([place] - stool cx +salmonella)  Current Treatment:  Na   Previous Treatment:   Physical  therapy  Stretching programs    Current Outpatient Medications:     buPROPion (WELLBUTRIN XL) 300 MG 24 hr tablet, Take 1 tablet every morning to help with depression., Disp: , Rfl:     cloNIDine (CATAPRES) 0.3 MG tablet, Take 0.3 mg by mouth every evening., Disp: , Rfl:     meloxicam (MOBIC) 15 MG tablet, Take 1 tablet (15 mg total) by mouth once daily., Disp: 30 tablet, Rfl: 1    metFORMIN (GLUCOPHAGE-XR) 500 MG ER 24hr tablet, Take 2 tablets (1,000 mg total) by mouth 2 (two) times a day., Disp: 360 tablet, Rfl: 2    methylphenidate HCl 54 MG CR tablet, Take 54 mg by mouth every morning., Disp: , Rfl:     pantoprazole (PROTONIX) 20 MG tablet, Take 1 tablet (20 mg total) by mouth once daily., Disp: 90 tablet, Rfl: 3    Past Medical History:   Diagnosis Date    ADHD (attention deficit hyperactivity disorder)     Anxiety     IBS (irritable bowel syndrome)      Family History   Problem Relation Name Age of Onset    No Known Problems Mother      No Known Problems Father      No Known Problems Brother       Social History     Socioeconomic History    Marital status: Single   Tobacco Use    Smoking status: Never     Passive exposure: Never    Smokeless tobacco: Never   Substance and Sexual Activity    Alcohol use: No    Drug use: No    Sexual activity: Not Currently     Partners: Female   Social History Narrative    4 cats, no smokers in household.     Social Determinants of Health     Financial Resource Strain: Low Risk  (5/16/2024)    Overall Financial Resource Strain (CARDIA)     Difficulty of Paying Living Expenses: Not hard at all   Food Insecurity: No Food Insecurity (5/16/2024)    Hunger Vital Sign     Worried About Running Out of Food in the Last Year: Never true     Ran Out of Food in the Last Year: Never true   Physical Activity: Insufficiently Active (5/16/2024)    Exercise Vital Sign     Days of Exercise per Week: 3 days     Minutes of Exercise per Session: 30 min   Stress: No Stress Concern Present  "(5/16/2024)    [employer] - Occupational Stress Questionnaire     Feeling of Stress : Not at all   Housing Stability: Unknown (5/16/2024)    Housing Stability Vital Sign     Unable to Pay for Housing in the Last Year: No     Review of patient's allergies indicates:   Allergen Reactions    Lactose Other (See Comments) and Rash     Abd pain.  Abd pain.  Abd pain.       Objective:   /64   Pulse 83   Ht 5' 8" (1.727 m)   Wt 106.2 kg (234 lb 2.1 oz)   BMI 35.60 kg/m²   Immunization History   Administered Date(s) Administered    COVID-19, MRNA, LN-S, PF (Pfizer) (Purple Cap) 05/24/2021, 06/14/2021, 12/22/2021, 12/22/2021    DTaP 2004, 2004, 07/11/2005, 12/17/2009    DTaP / HiB 08/29/2006    HPV 9-Valent 10/27/2015, 03/30/2016    HPV Quadrivalent 08/18/2015    Hepatitis A / Hepatitis B 04/28/2023    Hepatitis A, Pediatric/Adolescent, 2 Dose 03/29/2021    Hepatitis B 2004, 2004, 07/11/2005    Hepatitis B, Adult 2004, 2004, 07/11/2005    HiB PRP-T 2004, 07/11/2005    IPV 2004, 10/13/2005, 08/29/2006, 12/17/2009    Influenza 09/29/2020    Influenza (Flumist) - Quadrivalent - Intranasal *Preferred* (2-49 years old) 10/22/2013, 11/24/2014, 10/27/2015    Influenza - Intranasal 09/04/2009, 10/12/2010    Influenza - Intranasal - Trivalent 09/04/2009, 10/12/2010    Influenza - Quadrivalent 10/03/2016, 11/20/2017    Influenza - Quadrivalent - MDCK - PF 09/29/2020, 10/18/2022    Influenza - Quadrivalent - PF (6-35 months) 10/13/2005    Influenza - Quadrivalent - PF *Preferred* (6 months and older) 10/13/2005, 10/03/2016, 11/20/2017, 10/30/2018, 11/22/2019, 10/27/2021    Influenza - Trivalent (ADULT) 10/13/2005    Influenza - Trivalent - PF (ADULT) 10/13/2005, 09/04/2009, 10/12/2010    MMR 10/23/2007, 12/17/2009    Meningococcal Conjugate (MCV4P) 08/18/2015, 03/29/2021    Pneumococcal Conjugate - 7 Valent 2004, 07/11/2005, 10/13/2005, " 08/29/2006    Tdap 08/18/2015, 03/29/2021    Varicella 10/23/2007, 12/17/2009       Physical Exam   Constitutional: He is oriented to person, place, and time. No distress.   HENT:   Head: Normocephalic and atraumatic.   Pulmonary/Chest: Effort normal.   Musculoskeletal:         General: No swelling or tenderness. Normal range of motion.   Neurological: He is alert and oriented to person, place, and time.   Skin: Skin is dry. Rash noted.   Psychiatric: His behavior is normal. Mood normal.   Nursing note and vitals reviewed.    No synovitis, no dactylitis, no enthesitis  No effusions of large or small joints  100% fist formation    Significant decrease wrist est bilaterally - only about 30 degrees  Tight hamstrings bilaterally  5/5 strength hips/shoulders    Recent Results (from the past 672 hour(s))   Calprotectin, Stool    Collection Time: 06/07/24  7:48 AM   Result Value Ref Range    Calprotectin 10.6 <50 mcg/g   Glucose Tolerance 2 Hour    Collection Time: 06/07/24  8:04 AM   Result Value Ref Range    Gluc Fast 106 70 - 110 mg/dL    Gluc 1  mg/dL    Gluc 2  mg/dL   Insulin, Random    Collection Time: 06/07/24  8:04 AM   Result Value Ref Range    Insulin 363.8 (H) <25.0 uU/mL    Insulin Collection Interval unk    Lipid Panel    Collection Time: 06/07/24  8:04 AM   Result Value Ref Range    Cholesterol 178 120 - 199 mg/dL    Triglycerides 55 30 - 150 mg/dL    HDL 35 (L) 40 - 75 mg/dL    LDL Cholesterol 132.0 63.0 - 159.0 mg/dL    HDL/Cholesterol Ratio 19.7 (L) 20.0 - 50.0 %    Total Cholesterol/HDL Ratio 5.1 (H) 2.0 - 5.0    Non-HDL Cholesterol 143 mg/dL   Comprehensive Metabolic Panel    Collection Time: 06/07/24  8:04 AM   Result Value Ref Range    Sodium 138 136 - 145 mmol/L    Potassium 4.0 3.5 - 5.1 mmol/L    Chloride 105 95 - 110 mmol/L    CO2 25 23 - 29 mmol/L    Glucose 101 70 - 110 mg/dL    BUN 10 6 - 20 mg/dL    Creatinine 0.7 0.5 - 1.4 mg/dL    Calcium 9.1 8.7 - 10.5 mg/dL    Total Protein 7.2  6.0 - 8.4 g/dL    Albumin 3.6 3.5 - 5.2 g/dL    Total Bilirubin 0.4 0.1 - 1.0 mg/dL    Alkaline Phosphatase 93 55 - 135 U/L    AST 18 10 - 40 U/L    ALT 31 10 - 44 U/L    eGFR >60.0 >60 mL/min/1.73 m^2    Anion Gap 8 8 - 16 mmol/L   CBC Auto Differential    Collection Time: 06/07/24  8:04 AM   Result Value Ref Range    WBC 8.68 3.90 - 12.70 K/uL    RBC 4.69 4.60 - 6.20 M/uL    Hemoglobin 14.0 14.0 - 18.0 g/dL    Hematocrit 42.1 40.0 - 54.0 %    MCV 90 82 - 98 fL    MCH 29.9 27.0 - 31.0 pg    MCHC 33.3 32.0 - 36.0 g/dL    RDW 13.0 11.5 - 14.5 %    Platelets 334 150 - 450 K/uL    MPV 9.4 9.2 - 12.9 fL    Immature Granulocytes 0.2 0.0 - 0.5 %    Gran # (ANC) 5.3 1.8 - 7.7 K/uL    Immature Grans (Abs) 0.02 0.00 - 0.04 K/uL    Lymph # 2.7 1.0 - 4.8 K/uL    Mono # 0.5 0.3 - 1.0 K/uL    Eos # 0.1 0.0 - 0.5 K/uL    Baso # 0.07 0.00 - 0.20 K/uL    nRBC 0 0 /100 WBC    Gran % 61.4 38.0 - 73.0 %    Lymph % 31.0 18.0 - 48.0 %    Mono % 5.9 4.0 - 15.0 %    Eosinophil % 0.7 0.0 - 8.0 %    Basophil % 0.8 0.0 - 1.9 %    Differential Method Automated    Comprehensive Metabolic Panel    Collection Time: 06/07/24  8:04 AM   Result Value Ref Range    Sodium 138 136 - 145 mmol/L    Potassium 4.0 3.5 - 5.1 mmol/L    Chloride 105 95 - 110 mmol/L    CO2 25 23 - 29 mmol/L    Glucose 101 70 - 110 mg/dL    BUN 10 6 - 20 mg/dL    Creatinine 0.7 0.5 - 1.4 mg/dL    Calcium 9.1 8.7 - 10.5 mg/dL    Total Protein 7.2 6.0 - 8.4 g/dL    Albumin 3.6 3.5 - 5.2 g/dL    Total Bilirubin 0.4 0.1 - 1.0 mg/dL    Alkaline Phosphatase 93 55 - 135 U/L    AST 18 10 - 40 U/L    ALT 31 10 - 44 U/L    eGFR >60.0 >60 mL/min/1.73 m^2    Anion Gap 8 8 - 16 mmol/L   NICK Screen w/Reflex    Collection Time: 06/07/24  8:04 AM   Result Value Ref Range    NICK Screen Negative <1:80 Negative <1:80   Sedimentation rate    Collection Time: 06/07/24  8:04 AM   Result Value Ref Range    Sed Rate 17 (H) 0 - 10 mm/Hr   C-Reactive Protein    Collection Time: 06/07/24  8:04 AM  "  Result Value Ref Range    CRP 14.3 (H) 0.0 - 8.2 mg/L   Anti-Smith Antibody    Collection Time: 06/07/24  8:04 AM   Result Value Ref Range    Anti Sm Antibody 0.08 0.00 - 0.99 Ratio    Anti-Sm Interpretation Negative Negative   Anti Sm/RNP Antibody    Collection Time: 06/07/24  8:04 AM   Result Value Ref Range    Anti Sm/RNP Antibody 0.08 0.00 - 0.99 Ratio    Anti-Sm/RNP Interpretation Negative Negative   ANTI -SSA ANTIBODY    Collection Time: 06/07/24  8:04 AM   Result Value Ref Range    Anti-SSA Antibody 0.07 0.00 - 0.99 Ratio    Anti-SSA Interpretation Negative Negative   Sjogrens syndrome-B extractable nuclear antibody    Collection Time: 06/07/24  8:04 AM   Result Value Ref Range    Anti-SSB Antibody 0.06 0.00 - 0.99 Ratio    Anti-SSB Interpretation Negative Negative   ANTI-DNA ANTIBODY, DOUBLE-STRANDED    Collection Time: 06/07/24  8:04 AM   Result Value Ref Range    ds DNA Ab Negative 1:10 Negative 1:10       No results found for: "TBGOLDPLUS"   Lab Results   Component Value Date    HEPCAB Non-reactive 04/28/2023      Imaging  I have personally reviewed images and reports as below.  I agree with the interpretation.  MRI Hand Wrist W WO Bilat_Rheumatoid  Details    Reading Physician Reading Date Result Priority   Narciso Menjivar MD  [phone] 12/31/2022 Routine     Narrative & Impression  EXAMINATION:  MRI HAND_WRIST W WO BILATERAL_RHEUMATOID ARTHRITIS     CLINICAL HISTORY:  Stiffness of right wrist, not elsewhere classified     TECHNIQUE:  Multiplanar multisequence imaging of the left hand and wrist and right hand and wrist was performed with and without the intravenous administration of 10 cc of Gadavist     COMPARISON:  Bilateral hand x-rays, 12/09/2022.     FINDINGS:  Left hand and wrist: No fracture or malalignment.  No osseous erosion.  No bone marrow edema or bone marrow enhancement.  No enhancing synovitis.  The tendons and ligaments of the wrist and hand are intact.  No soft tissue mass.  No " inflammatory change in the soft tissues.     Right hand and wrist: No fracture or malalignment. No osseous erosion. No bone marrow edema or bone marrow enhancement. No enhancing synovitis. The tendons and ligaments of the wrist and hand are intact. No soft tissue mass.  No inflammatory change in the soft tissues.     Impression:     Normal bilateral hand and wrist MRI.  No arthritic changes identified.        Electronically signed by:Narciso Menjivar MD  Date:                                            12/31/2022  Time:                                           10:50       Assessment:     1. Generalized stiffness    2. Bilateral stiffness of wrist joints          Plan:     Nahum was seen today for generalized stiffness.    Diagnoses and all orders for this visit:    Generalized stiffness    Bilateral stiffness of wrist joints      Generalized stiffness  MRI hands/wirsts unrevealing   AI w/u unrevealing  Neuro eval to r/o central cause for stiffness  Mobic + Protonix as above  All precautions advised  DC if any s/e  Drug therapy requiring intensive monitoring for toxicity  High Risk Medication Monitoring encounter  No current medication related issues, no evidence of toxicity  I ordered labs for toxicity monitoring, have personally reviewed the findings, and discussed them with the patient.  Pending labs will be sent via the portal  Return to clinic: PRN    No follow-ups on file.    The patient understands, chooses and consents to this plan and accepts all the risks which include but are not limited to the risks mentioned above.     Disclaimer: This note was prepared using a voice recognition system and is likely to have sound alike errors within the text.

## 2024-06-28 NOTE — PROGRESS NOTES
"The patient location is: pt home, in [place]  The chief complaint leading to consultation is: desired weight loss    Visit type: audiovisual    Face to Face time with patient: 90 minutes  120 minutes of total time spent on the encounter, which includes face to face time and non-face to face time preparing to see the patient (eg, review of tests), Obtaining and/or reviewing separately obtained history, Documenting clinical information in the electronic or other health record, Independently interpreting results (not separately reported) and communicating results to the patient/family/caregiver, or Care coordination (not separately reported).         Each patient to whom he or she provides medical services by telemedicine is:  (1) informed of the relationship between the physician and patient and the respective role of any other health care provider with respect to management of the patient; and (2) notified that he or she may decline to receive medical services by telemedicine and may withdraw from such care at any time.    Notes:   Nutrition Assessment    Visit Type: Insurance initial  Session Time:  2 Hours  Reason for MNT visit: Pt in for education and nutrition counseling regarding  desired weight loss .       Age: 19 y.o.  Wt:   Wt Readings from Last 1 Encounters:   05/21/24 102.9 kg (226 lb 13.7 oz)     Ht:   Ht Readings from Last 1 Encounters:   05/21/24 5' 8" (1.727 m) (28%, Z= -0.58)*     * Growth percentiles are based on CDC (Boys, 2-20 Years) data.     BMI:   BMI Readings from Last 1 Encounters:   05/21/24 34.49 kg/m² (97%, Z= 1.89)*     * Growth percentiles are based on CDC (Boys, 2-20 Years) data.       Client states:  Pt stated that he would like to lose 40-50 lbs, He was 180 lbs -- 3-4 years ago, when he was sick -- unintentional 70 lbs over 6 months. Pt reports that he would like to get back down to 180 lbs in a healthful way.    Medical History  Problem List             Resolved    Hamstring " tightness of both lower extremities (Chronic)         Osgood-Schlatter's disease         Lactose intolerance         Tachycardia         Recurrent major depressive disorder in partial remission (Chronic)         Class 1 obesity due to excess calories with serious comorbidity and body mass index (BMI) of 34.0 to 34.9 in adult (Chronic)         Generalized hyperhidrosis         Autism spectrum disorder, high-functioning (Chronic)         Generalized anxiety disorder (Chronic)         Decreased range of motion of both wrists         Intrinsic muscle tightness         Obstructive sleep apnea syndrome (Chronic)         Weight gain         Attention deficit hyperactivity disorder (ADHD), combined type (Chronic)         Insulin resistance            Past Medical History:   Diagnosis Date    ADHD (attention deficit hyperactivity disorder)     Anxiety     IBS (irritable bowel syndrome)        Past Surgical History:   Procedure Laterality Date    TYMPANOSTOMY TUBE PLACEMENT Bilateral 2006          Medications    Prior to Admission medications    Medication Sig Start Date End Date Taking? Authorizing Provider   buPROPion (WELLBUTRIN XL) 300 MG 24 hr tablet Take 1 tablet every morning to help with depression. 7/8/20   Provider, Historical   cloNIDine (CATAPRES) 0.3 MG tablet Take 0.3 mg by mouth every evening. 10/6/20   Provider, Historical   meloxicam (MOBIC) 15 MG tablet Take 1 tablet (15 mg total) by mouth once daily. 5/21/24   Julia Kidd PA-C   metFORMIN (GLUCOPHAGE-XR) 500 MG ER 24hr tablet Take 2 tablets (1,000 mg total) by mouth 2 (two) times a day. 5/17/24   TERRELL Chaves MD   methylphenidate HCl 54 MG CR tablet Take 54 mg by mouth every morning.    Provider, Historical   pantoprazole (PROTONIX) 20 MG tablet Take 1 tablet (20 mg total) by mouth once daily. 5/21/24 5/21/25  Julia Kidd PA-C        Vitamins, Minerals, and/or Supplements:  None     Food Allergies or Intolerances:  Lactose  intolerant      Social History    Marital status:  Single    Social History     Tobacco Use    Smoking status: Never     Passive exposure: Never    Smokeless tobacco: Never   Substance Use Topics    Alcohol use: No     Current Alcohol use: On vacation or holidays (hasn't drank since July 2023), if he is on vacation, he may have 5-6 drinks at a time    Lab Reports:  Reviewed and noted     Lab Results   Component Value Date    TSH 1.652 04/28/2023    FREET4 1.07 09/29/2020    CRP 14.3 (H) 06/07/2024    SEDRATE 17 (H) 06/07/2024    AST 18 06/07/2024    AST 18 06/07/2024    ALT 31 06/07/2024    ALT 31 06/07/2024    HDL 35 (L) 06/07/2024    LDLCALC 132.0 06/07/2024    TRIG 55 06/07/2024    HGBA1C 5.2 04/28/2023         BP Readings from Last 1 Encounters:   05/21/24 113/76        24-hour Recall:   Typical day:    Work varies with energy expenditure    Wake- 3:30am/ 10 am   5:30 work starts- off at 4pm go home or to school  4am-4:30am BF- Protein shake- Equate brands- 30 g protein / sometimes eating something else with it, most of the time it is just the shake   **recommended 5am BF ** add strawberries to protein shake   9 am Snack- sometimes if needed / shop at work - chips/ powerade or body armor    **Recommend body armor lyte-- Plain Greek yogurt + berries + jeremiah seeds + almonds/ chopped peanuts   11:00-11:30am Lunch- premade meals- Frozen meals: hunger yolette meals- meat or chicken + corn or mashed potatoes, corn and brownie / left overs/ shop at work- grab and go meals- heat and eat   **Ideally eating every 3-4 hours   2/3pm Snack-sometimes at work they have snow cones in the summer// gatorade made into popsicle    **Apple and peanut butter// string cheese, nuts, dried fruit- balance breaks // hard boiled eggs sandwich -- delived eggs + grapes/ carrots   4:45/5pm/6pm Dinner- vaires- Greek food- sharwma chicken- chicken rice miri/ chicken dishes with fries or potatoes corn, green beans // restaurants- italian- rory //  tacos  Typically finishing the plate   Snack- scoop of ice cream, some candy- mini airhead  Bed- 9/10pm, weekend not sleeping until 1am  Ideally going to sleep by 8pm to have at least 7 hours of sleep on working days    Very picky vegetables --  he is open minded about trying more. He currently likes Green beans, corn, potatoes, bell peppers- sweet pepper - minis, spinach if cooked with cheese / carrots     Recommended <20 grams added sugar a day       Beverages:      Water- 8-10 bottles of water, 3 bottles  Tea- Sweet tea- 0-5 glasses per day  Soda- 1-2 bottles of coke or root beer or lisa, 3-4 x per week  Apple juice/Orange Juice- depends 1 glass per day  Coffee-- on occasion + sugar + milk      LIFESTYLE FACTORS    Dinning out: 1-2 x per week when in school 2x per week // Summer- varies, brother in town, more now, 3x  per week     Meal preparation/shopping:    Mom cooking     Sleep: good with medication Bed at 9-10am- 3:30am for work 4 days a week. Sleeping later on off days     Stress Level: low    Support System:   family    Exercise Regimen: lightly active (low intensity, 1-3 days a week) walking 3x per week 30 minutes. Sometimes at work has to go up and down stairs -- 50 flights of stairs per day -- this varies.  Pt walking between 8-10,000 steps per day at work, off days between 5-6K      Diagnosis    Inconsistent carbohydrate intake related to pt not preparing food, eating on the go as evidenced by food recall, consuming convenience foods.      Intervention    Estimated Energy Requirements:   Calories: 2000  Protein: 150-175 g  Carbohydrates: 175-200 g  Total Fat: 65-75 g  Baseline for fluids: 115 fl ounces    Recommendations & Goals:  Patient goals and recommendations are tailored to the specific patient's needs, readiness to change, lifestyle, culture, skills, resources, & abilities. Strategies to help achieve these nutrition-related goals were discussed which can include but are not limited to SMART  goal setting & mindful eating.     Aim for a minimum of 7 hours sleep   Exercise 60 minutes most days  Eat breakfast within 1-2 hours of waking up  Try not to skip any meals or snacks, not going more than 3-4 hours without eating   At each meal and snack, try to include a source of fiber + lean protein + healthy fat     Written Materials Provided  These resources are intended to assist the patient in making it easier to choose recommended options when eating out & to identify better-for-you brands at the grocery store:    Meal Planning Guide with recommendations discussed along with portion sizes and a customized meal plan   Fueling Well On-the-Go Food Guide  Eat Fit Shopping Guide  Lifestyle Nutrition Meal Guide  RD contact information    Goals:   -  Apple with protein shake in the morning  -  Incorporate more nutrient dense snacks in between main meals when going longer than 4 hours:greek yogurt + nuts// cheese stick +banana  -  reduce intake of beverages with added sugar and high sugar content       Monitoring/Evaluation    Monitor the following:  Weight  Sleep  Stress Management  Movement  Nutrient intake in reference to meal plan    Communicated with healthcare provider and documented plan for referral to appropriate agency/healthcare provider as needed    Supervising Physician: Zuhair Fisher MD    Patient motivation, anticipated barriers, expected compliance: Patient is motivated and has verbalized understanding and intent to comply.     Comprehension: good     Follow-up: in 8 weeks or as needed

## 2024-06-28 NOTE — PATIENT INSTRUCTIONS
Name: Nahum Alfaro   Date: 06/28/2024    Recommended daily energy requirements to reach your goals:  2000 Calories  150-175 grams Protein  175-200 grams Carbohydrates  65-75 grams Fat  115 ounces of fluid per day    Action Items:  -  Apple with protein shake in the morning  -  Incorporate more nutrient dense snacks in between main meals when going longer than 4 hours:greek yogurt + nuts// cheese stick +banana  -  reduce intake of beverages with added sugar and high sugar content

## 2024-07-12 ENCOUNTER — OFFICE VISIT (OUTPATIENT)
Dept: INTERNAL MEDICINE | Facility: CLINIC | Age: 19
End: 2024-07-12
Payer: COMMERCIAL

## 2024-07-12 DIAGNOSIS — E66.01 CLASS 2 SEVERE OBESITY DUE TO EXCESS CALORIES WITH SERIOUS COMORBIDITY AND BODY MASS INDEX (BMI) OF 35.0 TO 35.9 IN ADULT: Chronic | ICD-10-CM

## 2024-07-12 DIAGNOSIS — F90.2 ATTENTION DEFICIT HYPERACTIVITY DISORDER (ADHD), COMBINED TYPE: Chronic | ICD-10-CM

## 2024-07-12 DIAGNOSIS — E88.819 INSULIN RESISTANCE: Chronic | ICD-10-CM

## 2024-07-12 DIAGNOSIS — R73.03 PREDIABETES: Primary | Chronic | ICD-10-CM

## 2024-07-12 PROBLEM — E66.812 CLASS 2 SEVERE OBESITY DUE TO EXCESS CALORIES WITH SERIOUS COMORBIDITY AND BODY MASS INDEX (BMI) OF 35.0 TO 35.9 IN ADULT: Chronic | Status: ACTIVE | Noted: 2021-03-29

## 2024-07-12 PROBLEM — E66.812 CLASS 2 SEVERE OBESITY DUE TO EXCESS CALORIES WITH SERIOUS COMORBIDITY AND BODY MASS INDEX (BMI) OF 35.0 TO 35.9 IN ADULT: Status: ACTIVE | Noted: 2021-03-29

## 2024-07-12 PROCEDURE — G2211 COMPLEX E/M VISIT ADD ON: HCPCS | Mod: 95,,, | Performed by: FAMILY MEDICINE

## 2024-07-12 PROCEDURE — 99214 OFFICE O/P EST MOD 30 MIN: CPT | Mod: 95,,, | Performed by: FAMILY MEDICINE

## 2024-07-12 RX ORDER — METFORMIN HYDROCHLORIDE 500 MG/1
1000 TABLET, EXTENDED RELEASE ORAL 2 TIMES DAILY
Qty: 360 TABLET | Refills: 2 | Status: SHIPPED | OUTPATIENT
Start: 2024-07-12

## 2024-07-12 NOTE — PROGRESS NOTES
TELEMEDICINE VIRTUAL VIDEO VISIT  7/12/24  7:20 AM CDT    Visit Type: Audiovisual    Patient's Location: Nahum represents that they are located within the state of [place].    History of Present Illness    Nahum presents today for follow up to discuss test results and current medications.    He was diagnosed with prediabetes and insulin resistance based on a two-hour glucose tolerance test that revealed elevated insulin levels. He denies a diagnosis of diabetes at this time. He is unfamiliar with diabetic diets and lifestyle modifications, stating he has never known anyone with diabetes. He is interested in understanding the impact of weight loss on his condition, specifically inquiring about the potential benefits of losing 20-30 lbs. He currently takes metformin 1000 mg twice daily to help manage his condition and slow the progression of diabetes.    For his ADHD, he takes Concerta (methylphenidate) and bupropion, as prescribed by his psychiatrist, Dr. Jean-Baptiste. He states that these medications no longer help with appetite suppression as they used to due to long-term use.    He currently takes metformin 1000 mg twice daily for prediabetes management without any side effects. He is not currently taking pantoprazole for acid reflux, stating that he does not have issues with reflux at present. He denies any current problems related to lactose intolerance, which was the initial reason for the pantoprazole prescription.    A family member previously tried semaglutide for weight loss, but it was not effective and caused some sickness. Currently, the family member is using Trulicity obtained through a compound pharmacy with good results. He expresses a preference for obtaining weight loss medications from a compound pharmacy, citing concerns about safety and  when obtaining medications from weight loss clinics.       Review of Systems   Constitutional:  Negative for activity change and unexpected  weight change.   HENT:  Negative for hearing loss, rhinorrhea and trouble swallowing.    Eyes:  Negative for discharge and visual disturbance.   Respiratory:  Negative for chest tightness and wheezing.    Cardiovascular:  Negative for chest pain and palpitations.   Gastrointestinal:  Negative for blood in stool, constipation, diarrhea and vomiting.   Endocrine: Negative for polydipsia and polyuria.   Genitourinary:  Negative for difficulty urinating, hematuria and urgency.   Musculoskeletal:  Negative for arthralgias, joint swelling and neck pain.   Neurological:  Negative for weakness and headaches.   Psychiatric/Behavioral:  Negative for confusion and dysphoric mood.        Assessment & Plan     Patient has prediabetes with insulin resistance based on 2-hour glucose tolerance test showing elevated glucose and insulin levels   Weight loss is a key component of treatment plan to reduce risk of progression to diabetes, along with low carbohydrate diet and lifestyle modifications   Patient's current medications (metformin, methylphenidate, bupropion) are appropriate and generally promote weight loss rather than gain   Consulted with Dr. Carina Ventura, weight loss/metabolism specialist, regarding potential use of GLP-1 agonist medications (e.g. Ozempic, Mounjaro) for weight loss   Will do further research regarding potential options for obtaining GLP-1 agonist medication (e.g. Trulicity) from a compounding pharmacy or weight loss clinic for the purpose of weight loss  F90.2 ATTENTION-DEFICIT HYPERACTIVITY DISORDER, COMBINED TYPE:   The patient is currently taking ADHD medications, which include Concerta (methylphenidate) and bupropion.   Continued methylphenidate (Concerta) and bupropion at current doses under the care of Dr. Jean-Baptiste.   Acknowledged the importance of mental health control for overall health management.  Z79.899 OTHER LONG TERM (CURRENT) DRUG THERAPY:   The patient is on a long-term medication regimen  including metformin, Concerta (methylphenidate), and bupropion.   Continued metformin 1000 mg twice daily.   The patient reports tolerating metformin without side effects.   Reviewed and updated medication list.   Continued pantoprazole (Protonix) as needed for acid reflux symptoms.   Updated pharmacy information for prescription fulfillment.  R73.03 PREDIABETES:   Performed a two-hour glucose tolerance test, which showed prediabetes with high insulin levels indicating insulin resistance.   Discussed that diabetes exists on a spectrum, with prediabetes representing blood sugar above normal range but below diagnostic threshold for diabetes.   Explained the concept of prediabetes and the need for preventive measures.   Recommend a low carbohydrate diet and referred the patient to the American Diabetes Association website for dietary information.   Continued metformin 1000 mg twice daily to help slow progression of diabetes.  E66.01 MORBID (SEVERE) OBESITY DUE TO EXCESS CALORIES:   The patient has Class 2 obesity with a body mass index of 35.6.   Emphasized the importance of weight control in managing prediabetes.   Discussed potential weight loss medications and insurance coverage.   Suggested exploring options for obtaining GLP-1 agonist medication (e.g.   Trulicity) from a compounding pharmacy or weight loss clinic for the purpose of weight loss.   Scheduled a follow up with the patient regarding potential options for obtaining weight loss medication.  LIFESTYLE CHANGES:   Recommend patient review educational materials on [email or web address].org regarding healthy eating for diabetes/prediabetes prevention.       1. Prediabetes    2. Insulin resistance  Overview:  trial metformin    Orders:  -     metFORMIN (GLUCOPHAGE-XR) 500 MG ER 24hr tablet; Take 2 tablets (1,000 mg total) by mouth 2 (two) times a day.  Dispense: 360 tablet; Refill: 2    3. Class 2 severe obesity due to excess calories with serious comorbidity and body mass  index (BMI) of 35.0 to 35.9 in adult  -     metFORMIN (GLUCOPHAGE-XR) 500 MG ER 24hr tablet; Take 2 tablets (1,000 mg total) by mouth 2 (two) times a day.  Dispense: 360 tablet; Refill: 2    4. Attention deficit hyperactivity disorder (ADHD), combined type  Overview:  PSYCHIATRIST: Dr. Leny Gutiérrez ([facility])      No other significant complaints or concerns were reported.  Today's visit involved the intricate management of episodic problem(s) and the ongoing care for the patient's serious or complex condition(s) listed above, reflecting the inherent complexity of providing longitudinal, comprehensive evaluation and management as the central hub for the patient's primary care services.  There were no vitals filed for this visit.  Physical Exam    Constitutional: No acute distress. Normal appearance. Not ill-appearing.  Pulmonary: Pulmonary effort is normal. No respiratory distress.  Skin: Skin is not jaundiced.  Neurological: Alert. Mental status is at baseline.  Psychiatric: Mood normal. Behavior normal. Thought content normal.  Vitals: BMI: 35.6.         This note was generated with the assistance of ambient listening technology. Verbal consent was obtained by the patient and accompanying visitor(s) for the recording of patient appointment to facilitate this note. I attest to having reviewed and edited the generated note for accuracy, though some syntax or spelling errors may persist. Please contact the author of this note for any clarification.      I spent a total of 20 minutes today evaluating and managing this patient for this encounter.  This includes face to face time and non-face to face time preparing to see the patient (eg, review of tests), obtaining and/or reviewing separately obtained history, documenting clinical information in the electronic or other health record, independently interpreting results and communicating results to the patient/family/caregiver, or care coordinator. This time was  "exclusive of any separately billable procedures for this patient and exclusive of time spent treating any other patient.    Documentation entered by me for this encounter may have been done in part using speech-recognition technology. Although I have made an effort to ensure accuracy, "sound like" errors may exist and should be interpreted in context.    Each patient to whom medical services are provided by telemedicine is: (1) informed of the relationship between the physician and patient and the respective role of any other health care provider with respect to management of the patient; and (2) notified that he or she may decline to receive medical services by telemedicine and may withdraw from such care at any time.   "

## 2024-07-18 NOTE — PROGRESS NOTES
"Subjective:      Patient ID: Nahum Alfaro is a 19 y.o. male.    Chief Complaint:  " Stiffness ".     HPI 19 Years old C. Male with PMHx of ADDIE / ADHD / Obesity / Insuline resistance and others Medical issues   came for the evaluation and recommendation of " Stiffness ".      Started: about 10 years.   Describes: " inability to extend or flex " certain extremities.  Timing: constant.   Frequency: daily.   Pain:  none.   Location: " all over ".   Family: Father with similar issues.   Medications: Glucophage / Wellbutrin.   Worsen: none.   Alleviated: none.   Associated symptoms: bouts of muscles cramps - randomly.   Triggers: none.   Prodrome symptoms: none.         Referral by PCP.       Progressing.      PT program few times.      Have seem Neurologist - nothing found.      Physical wise no interfere ADL's.     Has to adjust certain movements for work or other demanding Physical activities - no pain / no sensory symptoms.        Review of Systems  Review of Systems   Neurological:         Stiffness.    All other systems reviewed and are negative.    Objective:     Neurologic Exam     Mental Status   Oriented to person, place, and time.   Registration: recalls 3 of 3 objects. Recall at 5 minutes: recalls 3 of 3 objects. Follows 3 step commands.   Attention: normal. Concentration: normal.   Speech: speech is normal   Level of consciousness: alert  Knowledge: good. Able to perform simple calculations.   Able to name object. Able to read. Able to repeat. Able to write. Normal comprehension.     Cranial Nerves   Cranial nerves II through XII intact.     CN III, IV, VI   Pupils are equal, round, and reactive to light.    Motor Exam   Muscle bulk: normal  Overall muscle tone: normal    Strength   Strength 5/5 throughout.   Right neck flexion: 5/5  Left neck flexion: 5/5  Right neck extension: 5/5  Left neck extension: 5/5  Right deltoid: 5/5  Left deltoid: 5/5  Right biceps: 5/5  Left biceps: 5/5  Right triceps: 5/5  Left " triceps: 5/5  Right wrist flexion: 5/5  Left wrist flexion: 5/5  Right wrist extension: 5/5  Left wrist extension: 5/5  Right interossei: 5/5  Left interossei: 5/5  Right abdominals: 5/5  Left abdominals: 5/5  Right iliopsoas: 5/5  Left iliopsoas: 5/5  Right quadriceps: 5/5  Left quadriceps: 5/5  Right hamstrin/5  Left hamstrin/5  Right glutei: 5/5  Left glutei: 5/5  Right anterior tibial: 5/5  Left anterior tibial: 5/5  Right posterior tibial: 5/5  Left posterior tibial: 5/5  Right peroneal: 5/5  Left peroneal: 5/5  Right gastroc: 5/5  Left gastroc: 5/5    Sensory Exam   Light touch normal.   Vibration normal.   Proprioception normal.   Pinprick normal.   Graphesthesia: normal  Stereognosis: normal    Gait, Coordination, and Reflexes     Gait  Gait: spastic (Mild)    Coordination   Romberg: negative  Finger to nose coordination: normal  Heel to shin coordination: normal  Tandem walking coordination: normal    Tremor   Resting tremor: absent  Intention tremor: absent  Action tremor: absent    Reflexes   Right brachioradialis: 2+  Left brachioradialis: 2+  Right biceps: 2+  Left biceps: 2+  Right triceps: 2+  Left triceps: 2+  Right patellar: 2+  Left patellar: 2+  Right achilles: 2+  Left achilles: 2+  Right : 2+  Left : 2+  Right plantar: normal  Left plantar: normal  Right Gray: absent  Left Gray: absent  Right ankle clonus: absent  Left ankle clonus: absent  Right pendular knee jerk: absent  Left pendular knee jerk: absent      Physical Exam  Vitals and nursing note reviewed.   Constitutional:       Appearance: Normal appearance. He is normal weight.   HENT:      Head: Normocephalic and atraumatic.      Right Ear: Tympanic membrane normal.      Left Ear: Tympanic membrane normal.      Nose: Nose normal.      Mouth/Throat:      Mouth: Mucous membranes are moist.      Pharynx: Oropharynx is clear.   Eyes:      Extraocular Movements: Extraocular movements intact.      Conjunctiva/sclera:  "Conjunctivae normal.      Pupils: Pupils are equal, round, and reactive to light.   Cardiovascular:      Rate and Rhythm: Normal rate and regular rhythm.      Pulses: Normal pulses.      Heart sounds: Normal heart sounds.   Pulmonary:      Effort: Pulmonary effort is normal.      Breath sounds: Normal breath sounds.   Abdominal:      General: Abdomen is flat. Bowel sounds are normal.      Palpations: Abdomen is soft.   Genitourinary:     Comments: Deferred.   Musculoskeletal:      Cervical back: Rigidity present.      Comments: Stiffness to PROM all extremities.    Skin:     General: Skin is warm and dry.      Capillary Refill: Capillary refill takes less than 2 seconds.   Neurological:      Mental Status: He is alert and oriented to person, place, and time. Mental status is at baseline.      Cranial Nerves: Cranial nerves 2-12 are intact.      Motor: Motor strength is normal.     Coordination: Finger-Nose-Finger Test, Heel to Shin Test and Romberg Test normal.      Gait: Tandem walk normal.      Deep Tendon Reflexes:      Reflex Scores:       Tricep reflexes are 2+ on the right side and 2+ on the left side.       Bicep reflexes are 2+ on the right side and 2+ on the left side.       Brachioradialis reflexes are 2+ on the right side and 2+ on the left side.       Patellar reflexes are 2+ on the right side and 2+ on the left side.       Achilles reflexes are 2+ on the right side and 2+ on the left side.  Psychiatric:         Mood and Affect: Mood normal.         Speech: Speech normal.         Behavior: Behavior normal.         Thought Content: Thought content normal.         Judgment: Judgment normal.        Assessment:   19 Years old C. Male  with PMHX as above came of the evaluation of " Stiffness ".   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  Plan:   Patient Neurological Assessment is remarkable for stiffness all over and increase tightness to PROM with the ramdon   Cramping SMS is a high possibility, another clue is his " Father - since this a Autoimmune ds.     ARGELIA antibodies.     Labs: CBC / CMP / Lipids panel / [facility] - 2024 - non significant abnormalities.     Insuline random: 363 ( < 25 ).     Please do not hesitate to contact me with any updates, questions or concerns.    Alfonzo Markham MD.  General Neurologist.

## 2024-07-19 ENCOUNTER — LAB VISIT (OUTPATIENT)
Dept: LAB | Facility: HOSPITAL | Age: 19
End: 2024-07-19
Attending: PSYCHIATRY & NEUROLOGY
Payer: COMMERCIAL

## 2024-07-19 ENCOUNTER — TELEPHONE (OUTPATIENT)
Dept: NEUROLOGY | Facility: CLINIC | Age: 19
End: 2024-07-19

## 2024-07-19 ENCOUNTER — OFFICE VISIT (OUTPATIENT)
Dept: NEUROLOGY | Facility: CLINIC | Age: 19
End: 2024-07-19
Payer: COMMERCIAL

## 2024-07-19 VITALS
DIASTOLIC BLOOD PRESSURE: 82 MMHG | HEIGHT: 68 IN | WEIGHT: 231.69 LBS | SYSTOLIC BLOOD PRESSURE: 134 MMHG | RESPIRATION RATE: 16 BRPM | HEART RATE: 96 BPM | OXYGEN SATURATION: 99 % | BODY MASS INDEX: 35.11 KG/M2

## 2024-07-19 DIAGNOSIS — G25.82 STIFF-MAN SYNDROME: Primary | ICD-10-CM

## 2024-07-19 DIAGNOSIS — M25.60 GENERALIZED STIFFNESS: ICD-10-CM

## 2024-07-19 DIAGNOSIS — G25.82 STIFF-MAN SYNDROME: ICD-10-CM

## 2024-07-19 PROCEDURE — 1160F RVW MEDS BY RX/DR IN RCRD: CPT | Mod: CPTII,S$GLB,, | Performed by: PSYCHIATRY & NEUROLOGY

## 2024-07-19 PROCEDURE — 3075F SYST BP GE 130 - 139MM HG: CPT | Mod: CPTII,S$GLB,, | Performed by: PSYCHIATRY & NEUROLOGY

## 2024-07-19 PROCEDURE — 86341 ISLET CELL ANTIBODY: CPT | Performed by: PSYCHIATRY & NEUROLOGY

## 2024-07-19 PROCEDURE — 99204 OFFICE O/P NEW MOD 45 MIN: CPT | Mod: S$GLB,,, | Performed by: PSYCHIATRY & NEUROLOGY

## 2024-07-19 PROCEDURE — 1159F MED LIST DOCD IN RCRD: CPT | Mod: CPTII,S$GLB,, | Performed by: PSYCHIATRY & NEUROLOGY

## 2024-07-19 PROCEDURE — 3079F DIAST BP 80-89 MM HG: CPT | Mod: CPTII,S$GLB,, | Performed by: PSYCHIATRY & NEUROLOGY

## 2024-07-19 PROCEDURE — 99999 PR PBB SHADOW E&M-EST. PATIENT-LVL IV: CPT | Mod: PBBFAC,,, | Performed by: PSYCHIATRY & NEUROLOGY

## 2024-07-19 PROCEDURE — 3008F BODY MASS INDEX DOCD: CPT | Mod: CPTII,S$GLB,, | Performed by: PSYCHIATRY & NEUROLOGY

## 2024-07-19 PROCEDURE — 36415 COLL VENOUS BLD VENIPUNCTURE: CPT | Performed by: PSYCHIATRY & NEUROLOGY

## 2024-07-19 RX ORDER — SERDEXMETHYLPHENIDATE AND DEXMETHYLPHENIDATE 10.4; 52.3 MG/1; MG/1
1 CAPSULE ORAL EVERY MORNING
COMMUNITY
Start: 2024-07-01

## 2024-07-19 RX ORDER — PAROXETINE HYDROCHLORIDE HEMIHYDRATE 25 MG/1
TABLET, FILM COATED, EXTENDED RELEASE ORAL
COMMUNITY
Start: 2024-06-28

## 2024-07-19 NOTE — TELEPHONE ENCOUNTER
Received a call from patient's mother. She was crying, asking questions about the pt's diagnosis. I asked the provider what it means, he stated there is no definite answer until the lab results come back.     I asked her to join her son during his f/u, to make sure she gets abetter understanding on what is going on. She verbalized understanding.

## 2024-07-24 DIAGNOSIS — G25.82 STIFF-MAN SYNDROME: Primary | ICD-10-CM

## 2024-07-24 LAB — GAD65 AB SER-SCNC: 0.07 NMOL/L

## 2024-07-24 RX ORDER — BACLOFEN 20 MG/1
20 TABLET ORAL 2 TIMES DAILY
Qty: 60 TABLET | Refills: 2 | Status: SHIPPED | OUTPATIENT
Start: 2024-07-24 | End: 2024-07-26 | Stop reason: SDUPTHER

## 2024-07-25 ENCOUNTER — TELEPHONE (OUTPATIENT)
Dept: NEUROLOGY | Facility: CLINIC | Age: 19
End: 2024-07-25
Payer: COMMERCIAL

## 2024-07-25 NOTE — TELEPHONE ENCOUNTER
----- Message from Alfonzo Bolanos MD sent at 7/24/2024  4:05 PM CDT -----  Would you call Patient and tell Him that the blood work came mild positive - will re check back in the future / also tell Him to try Baclofen 20 mg twice a day / I sent it to [facility], [place].  ----- Message -----  From: [employer] [facility]  Sent: 7/24/2024   3:16 PM CDT  To: Alfonzo Bolanos MD

## 2024-07-26 ENCOUNTER — TELEPHONE (OUTPATIENT)
Dept: NEUROLOGY | Facility: CLINIC | Age: 19
End: 2024-07-26
Payer: COMMERCIAL

## 2024-07-26 DIAGNOSIS — G25.82 STIFF-MAN SYNDROME: Primary | ICD-10-CM

## 2024-07-26 DIAGNOSIS — G25.82 STIFF-MAN SYNDROME: ICD-10-CM

## 2024-07-26 DIAGNOSIS — M25.60 GENERALIZED STIFFNESS: ICD-10-CM

## 2024-07-26 RX ORDER — BACLOFEN 20 MG/1
20 TABLET ORAL 2 TIMES DAILY
Qty: 60 TABLET | Refills: 2 | Status: SHIPPED | OUTPATIENT
Start: 2024-07-26 | End: 2024-10-24

## 2024-07-26 NOTE — TELEPHONE ENCOUNTER
----- Message from Tim Bustillo sent at 7/26/2024  9:39 AM CDT -----  Contact: [phone]  Type:  Patient Returning Call    Who Called:mom  Who Left Message for Patient: AMY KARLOJOSE  Does the patient know what this is regarding?:missed a call from your office  Would the patient rather a call back or a response via [employer]? Call back  Best Call Back Number: [phone]  Additional Information: [facility]   [id number]

## 2024-07-26 NOTE — TELEPHONE ENCOUNTER
Spoke with pt's mother and informed about the patient's blood work and about the medication sent to the pharmacy.

## 2024-07-29 ENCOUNTER — TELEPHONE (OUTPATIENT)
Dept: PHYSICAL MEDICINE AND REHAB | Facility: CLINIC | Age: 19
End: 2024-07-29
Payer: COMMERCIAL

## 2024-08-06 ENCOUNTER — TELEPHONE (OUTPATIENT)
Dept: INTERNAL MEDICINE | Facility: CLINIC | Age: 19
End: 2024-08-06
Payer: COMMERCIAL

## 2024-08-23 ENCOUNTER — OFFICE VISIT (OUTPATIENT)
Dept: PHYSICAL MEDICINE AND REHAB | Facility: CLINIC | Age: 19
End: 2024-08-23
Payer: COMMERCIAL

## 2024-08-23 ENCOUNTER — OFFICE VISIT (OUTPATIENT)
Dept: NEUROLOGY | Facility: CLINIC | Age: 19
End: 2024-08-23
Payer: COMMERCIAL

## 2024-08-23 VITALS — BODY MASS INDEX: 35.11 KG/M2 | WEIGHT: 231.69 LBS | HEIGHT: 68 IN | RESPIRATION RATE: 13 BRPM

## 2024-08-23 DIAGNOSIS — R20.2 PARESTHESIA: Primary | ICD-10-CM

## 2024-08-23 DIAGNOSIS — G25.82 STIFF-MAN SYNDROME: Primary | ICD-10-CM

## 2024-08-23 DIAGNOSIS — M25.60 GENERALIZED STIFFNESS: ICD-10-CM

## 2024-08-23 DIAGNOSIS — G25.82 STIFF-MAN SYNDROME: ICD-10-CM

## 2024-08-23 PROCEDURE — 1159F MED LIST DOCD IN RCRD: CPT | Mod: CPTII,95,, | Performed by: PSYCHIATRY & NEUROLOGY

## 2024-08-23 PROCEDURE — 99999 PR PBB SHADOW E&M-EST. PATIENT-LVL III: CPT | Mod: PBBFAC,,, | Performed by: PHYSICAL MEDICINE & REHABILITATION

## 2024-08-23 PROCEDURE — 1160F RVW MEDS BY RX/DR IN RCRD: CPT | Mod: CPTII,95,, | Performed by: PSYCHIATRY & NEUROLOGY

## 2024-08-23 PROCEDURE — 99213 OFFICE O/P EST LOW 20 MIN: CPT | Mod: 95,,, | Performed by: PSYCHIATRY & NEUROLOGY

## 2024-08-23 RX ORDER — BACLOFEN 20 MG/1
20 TABLET ORAL 3 TIMES DAILY
Qty: 90 TABLET | Refills: 2 | Status: SHIPPED | OUTPATIENT
Start: 2024-08-23 | End: 2024-11-21

## 2024-08-23 NOTE — PROGRESS NOTES
"Subjective:       Patient ID: Nahum Alfaro is a 19 y.o. male.    Chief Complaint: Stiff-man syndrome      HPI    The patient presented on 07/ 2024 for evaluation of " stiffness".  Mother present.   New issues: none.   Side effects: none.   Stiffness: no changes with Baclofen.       The originating site (patient location) is: Home.    The distant site (neurologist location) is: Neurology Clinic at [facility].    The chief complaint leading to consultation is: " stiffness ".     Visit type: Virtual visit with synchronous audio and video.    Consent: The patient verbally consented to participating in the video visit and informed that may   decline to receive medical services by telemedicine and may withdraw from such care at any time.    I discussed with the patient the nature of our telemedicine visits, that:    I  would evaluate the patient and recommend diagnostics and treatments based on my assessment.    Our sessions are not being recorded and that personal health information is protected.    Our team would provide follow up care in person if/when the patient needs it.    Virtual (video/telemedicine) visits have significant limitations. A telemedicine exam is primarily focused on the history and what I can observe.   Several critical parts of the neurological exam cannot be performed.     Review of Systems          Current Outpatient Medications:     AZSTARYS 52.3 mg- 10.4 mg Cap, Take 1 capsule by mouth every morning., Disp: , Rfl:     baclofen (LIORESAL) 20 MG tablet, Take 1 tablet (20 mg total) by mouth 2 (two) times daily., Disp: 60 tablet, Rfl: 2    buPROPion (WELLBUTRIN XL) 300 MG 24 hr tablet, Take 1 tablet every morning to help with depression., Disp: , Rfl:     cloNIDine (CATAPRES) 0.3 MG tablet, Take 0.3 mg by mouth every evening., Disp: , Rfl:     meloxicam (MOBIC) 15 MG tablet, Take 1 tablet (15 mg total) by mouth once daily., Disp: 30 tablet, Rfl: 1    metFORMIN (GLUCOPHAGE-XR) 500 MG ER " 24hr tablet, Take 2 tablets (1,000 mg total) by mouth 2 (two) times a day., Disp: 360 tablet, Rfl: 2    methylphenidate HCl 54 MG CR tablet, Take 54 mg by mouth every morning., Disp: , Rfl:     pantoprazole (PROTONIX) 20 MG tablet, Take 1 tablet (20 mg total) by mouth once daily., Disp: 90 tablet, Rfl: 3    paroxetine (PAXIL-CR) 25 MG 24 hr tablet, Take 1 tablet every evening to help with anxiety., Disp: , Rfl:     Past Medical History:   Diagnosis Date    ADHD (attention deficit hyperactivity disorder)     Anxiety     IBS (irritable bowel syndrome)        Past Surgical History:   Procedure Laterality Date    TYMPANOSTOMY TUBE PLACEMENT Bilateral 2006       Social History     Socioeconomic History    Marital status: Single   Tobacco Use    Smoking status: Never     Passive exposure: Never    Smokeless tobacco: Never   Substance and Sexual Activity    Alcohol use: No    Drug use: No    Sexual activity: Not Currently     Partners: Female   Social History Narrative    4 cats, no smokers in household.     Social Determinants of Health     Financial Resource Strain: Low Risk  (5/16/2024)    Overall Financial Resource Strain (CARDIA)     Difficulty of Paying Living Expenses: Not hard at all   Food Insecurity: No Food Insecurity (5/16/2024)    Hunger Vital Sign     Worried About Running Out of Food in the Last Year: Never true     Ran Out of Food in the Last Year: Never true   Physical Activity: Insufficiently Active (5/16/2024)    Exercise Vital Sign     Days of Exercise per Week: 3 days     Minutes of Exercise per Session: 30 min   Stress: No Stress Concern Present (5/16/2024)    [employer] - Occupational Stress Questionnaire     Feeling of Stress : Not at all   Housing Stability: Unknown (5/16/2024)    Housing Stability Vital Sign     Unable to Pay for Housing in the Last Year: No       Past/Current Medical/Surgical History, Past/Current Social History, Past/Current Family History and  Past/Current Medications were reviewed in detail.    Objective:     GENERAL APPEARANCE:     The patient looks comfortable.    No signs of respiratory distress.    Normal breathing pattern.    No dysmorphic features    Normal eye contact.     GENERAL MEDICAL EXAM:    HEENT:  Head is atraumatic normocephalic.      Neck and Axillae: No JVD. No visible lesions.    Cardiopulmonary: No cyanosis. No tachypnea. Normal respiratory effort.    Gastrointestinal/Urogenital:  No jaundice. No stomas or lesions. No visible hernias. No catheters.     Skin, Hair and Nails: No pathognonomic skin rash. No neurofibromatosis. No visible lesions.No stigmata of autoimmune disease. No clubbing.    Limbs: No varicose veins. No visible swelling.    Muskoskeletal: No visible deformities.No visible lesions.         Neurologic Exam    Lab Results   Component Value Date    WBC 8.68 06/07/2024    HGB 14.0 06/07/2024    HCT 42.1 06/07/2024    MCV 90 06/07/2024     06/07/2024       Sodium   Date Value Ref Range Status   06/07/2024 138 136 - 145 mmol/L Final   06/07/2024 138 136 - 145 mmol/L Final     Potassium   Date Value Ref Range Status   06/07/2024 4.0 3.5 - 5.1 mmol/L Final   06/07/2024 4.0 3.5 - 5.1 mmol/L Final     Chloride   Date Value Ref Range Status   06/07/2024 105 95 - 110 mmol/L Final   06/07/2024 105 95 - 110 mmol/L Final     CO2   Date Value Ref Range Status   06/07/2024 25 23 - 29 mmol/L Final   06/07/2024 25 23 - 29 mmol/L Final     Glucose   Date Value Ref Range Status   06/07/2024 101 70 - 110 mg/dL Final   06/07/2024 101 70 - 110 mg/dL Final     BUN   Date Value Ref Range Status   06/07/2024 10 6 - 20 mg/dL Final   06/07/2024 10 6 - 20 mg/dL Final     Creatinine   Date Value Ref Range Status   06/07/2024 0.7 0.5 - 1.4 mg/dL Final   06/07/2024 0.7 0.5 - 1.4 mg/dL Final     Calcium   Date Value Ref Range Status   06/07/2024 9.1 8.7 - 10.5 mg/dL Final   06/07/2024 9.1 8.7 - 10.5 mg/dL Final     Total Protein   Date Value Ref  Range Status   06/07/2024 7.2 6.0 - 8.4 g/dL Final   06/07/2024 7.2 6.0 - 8.4 g/dL Final     Albumin   Date Value Ref Range Status   06/07/2024 3.6 3.5 - 5.2 g/dL Final   06/07/2024 3.6 3.5 - 5.2 g/dL Final     Total Bilirubin   Date Value Ref Range Status   06/07/2024 0.4 0.1 - 1.0 mg/dL Final     Comment:     For infants and newborns, interpretation of results should be based  on gestational age, weight and in agreement with clinical  observations.    Premature Infant recommended reference ranges:  Up to 24 hours.............<8.0 mg/dL  Up to 48 hours............<12.0 mg/dL  3-5 days..................<15.0 mg/dL  6-29 days.................<15.0 mg/dL     06/07/2024 0.4 0.1 - 1.0 mg/dL Final     Comment:     For infants and newborns, interpretation of results should be based  on gestational age, weight and in agreement with clinical  observations.    Premature Infant recommended reference ranges:  Up to 24 hours.............<8.0 mg/dL  Up to 48 hours............<12.0 mg/dL  3-5 days..................<15.0 mg/dL  6-29 days.................<15.0 mg/dL       Alkaline Phosphatase   Date Value Ref Range Status   06/07/2024 93 55 - 135 U/L Final   06/07/2024 93 55 - 135 U/L Final     AST   Date Value Ref Range Status   06/07/2024 18 10 - 40 U/L Final   06/07/2024 18 10 - 40 U/L Final     ALT   Date Value Ref Range Status   06/07/2024 31 10 - 44 U/L Final   06/07/2024 31 10 - 44 U/L Final     Anion Gap   Date Value Ref Range Status   06/07/2024 8 8 - 16 mmol/L Final   06/07/2024 8 8 - 16 mmol/L Final     eGFR if    Date Value Ref Range Status   01/20/2020 SEE COMMENT >60 mL/min/1.73 m^2 Final     eGFR if non    Date Value Ref Range Status   01/20/2020 SEE COMMENT >60 mL/min/1.73 m^2 Final     Comment:     Calculation used to obtain the estimated glomerular filtration  rate (eGFR) is the CKD-EPI equation.   Test not performed.  GFR calculation is only valid for patients   18 and older.    "      No results found for: "[id number]"    Lab Results   Component Value Date    TSH 1.652 04/28/2023    FREET4 1.07 09/29/2020     No results found in the last 24 hours.    LABORATORY EVALUATION    RADIOLOGY EVALUATION     NEUROPHYSIOLOGY EVALUATION     PATHOLOGY EVALUATION      NEUROCOGNITIVE AND NEUROPSYCHOLOGY EVALUATION     Reviewed the neuroimaging independently     Assessment:   19 Years old C. Male  with PMHX as above came of the evaluation of " Stiffness ".   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  Plan:   Stable.   Baclofen no much helps according to Patient.   Increase Baclofen to TID.   Will refer Patient to Neuromuscular specialist.     Will r/ o Ankylosis Spondylitis.  X Ray Sacroiliaca joints.     Upoin questioning - possible similar situation with Father / They ( Mother and Patient ) will ask his Father about it.      ARGELIA antibodies - 0.07 ( 0.02 nmo / L ) .     NCS/ EMG: normal.      Labs: CBC / CMP / Lipids panel / [facility] - 2024 - non significant abnormalities.      Insuline random: 363 ( < 25 ).     MEDICAL/SURGICAL COMORBIDITIES     All relevant medical comorbidities noted and managed by primary care physician and medical care team.      HEALTHY LIFESTYLE AND PREVENTATIVE CARE    The patient to adhere to the age-appropriate health maintenance guidelines including screening tests and vaccinations.   The patient to adhere to  healthy lifestyle, optimal weight, exercise, healthy diet,   good sleep hygiene and avoiding drugs including smoking, alcohol and recreational drugs.    RTC     I spent a total of 20 minutes on the day of the visit.This includes face to face time and non-face to face time preparing to see the patient (eg, review of tests),   obtaining and/or reviewing separately obtained history, documenting clinical information in the electronic or other health record,   independently interpreting results and communicating results to the patient/family/caregiver, or care coordinator.    Please do not " hesitate to contact me with any updates, questions or concerns.    Alfonzo Markham MD.  General Neurologist.

## 2024-08-23 NOTE — PROGRESS NOTES
[facility]   [id number]  [place] [address]  Phone: [phone]        Full Name: Nahum Alfaro YOB: 2005  Patient ID: [id number]      Visit Date: 8/23/2024 09:47  Age: 19 Years 3 Months Old  Examining Physician: Rosana Leavitt M.D.  Referring Physician: Dr Markham  Reason for Referral: upper and lower ext        Chief Complaint   Patient presents with    Muscle Pain     HPI: This is a 19 y.o.  male being seen in clinic today for evaluation of chronic muscle tightness/stiffness with limited ROM.  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 Pt's father with similar issues but not as significant. Movement and position change provides some relief.     History obtained from patient    Past family, medical, social, and surgical history reviewed in chart    Review of Systems:     General- denies lethargy, weight change, fever, chills  Head/neck- denies swallowing difficulties  ENT- denies hearing changes  Cardiovascular-denies chest pain  Pulmonary- denies shortness of breath  GI- denies constipation or bowel incontinence  - denies bladder incontinence  Skin- denies wounds or rashes  Musculoskeletal- denies weakness, + pain  Neurologic- denies numbness and tingling  Psychiatric- denies depressive or psychotic features, denies anxiety  Lymphatic-denies swelling  Endocrine- denies hypoglycemic symptoms/DM history  All other pertinent systems negative     Physical Examination:  General: Well developed, well nourished male, NAD  HEENT:NCAT EOMI bilaterally   Pulmonary:Normal respirations    Spinal Examination: CERVICAL  Active ROM is within normal limits.  Inspection: No deformity of spinal alignment.    Spinal Examination: LUMBAR or THORACIC  Active ROM is within normal limits.  Inspection: No deformity of spinal alignment.    Slr neg bilaterally    Musculoskeletal Tests:  Phalen: neg  Elbow compression (ulnar): neg  Tinels at wrist: neg    Bilateral Upper and  Lower Extremities:  Pulses are 2+ at radial bilaterally.  Shoulder/Elbow/Wrist/Hand ROM wnl except limited ROM at pro/supination, wrist ROM  Hip/Knee/Ankle ROM wnl except limited ROM at knee and ankles.  Bilateral Extremities show normal capillary refill.  No signs of cyanosis, rubor, edema, skin changes, or dysvascular changes of appendages.  Nails appear intact.    Neurological Exam:  Cranial Nerves:  II-XII grossly intact    Manual Muscle Testing: (Motor 5=normal)  5/5 strength bilateral upper/lower extremities    No focal atrophy is noted of either upper or lower extremity.    Bilateral Reflexes:  Gray's response is absent bilaterally.  No clonus at knee or ankle.    Sensation: tested to light touch  - intact in all four limbs.    Gait: Narrow base and good arm swing.      Entire procedure explained to patient prior to proceeding.  Verbal consent obtained      SNC      Nerve / Sites Rec. Site Onset Lat Peak Lat Amp Segments Distance Velocity     ms ms µV  mm m/s   R Median - Digit II (Antidromic)      Wrist Dig II 2.4 3.2 23.4 Wrist - Dig  58   L Median - Digit II (Antidromic)      Wrist Dig II 2.4 3.0 11.3 Wrist - Dig  57   R Ulnar - Digit V (Antidromic)      Wrist Dig V 2.3 3.0 10.6 Wrist - Dig V 140 60   L Ulnar - Digit V (Antidromic)      Wrist Dig V 2.3 3.1 21.0 Wrist - Dig V 140 61   R Radial - Anatomical snuff box (Forearm)      Forearm Wrist 1.4 2.2 17.5 Forearm - Wrist 100 74   L Radial - Anatomical snuff box (Forearm)      Forearm Wrist 1.2 2.1 20.5 Forearm - Wrist 100 83   R Sural - Ankle (Calf)      Calf Ankle 2.3 3.2 23.8 Calf - Ankle 140 60   R Superficial peroneal - Ankle      Lat leg Ankle 2.1 2.9 4.3 Lat leg - Ankle 140 67       CSI      Nerve / Sites Rec. Site Peak Lat NP Amp Segments Peak Diff     ms µV  ms   R Median - CSI      Median Thumb 2.4 0.46 Median - Radial 0.0      Radial Thumb 2.4 4.1 Median - Ulnar -0.1      Median Ring 3.0 14.7 Median palm - Ulnar palm 0.3       Ulnar Ring 3.1 6.8        Median palm Wrist 1.8 20.4        Ulnar palm Wrist 1.6 15.8        CSI    CSI 0.2       MNC      Nerve / Sites Muscle Latency Amplitude Duration Rel Amp Segments Distance Lat Diff Velocity     ms mV ms %  mm ms m/s   R Median - APB      Wrist APB 2.7 5.3 5.2 100 Wrist - APB 80        Elbow APB 6.9 5.5 5.3 103 Elbow - Wrist 250 4.3 59   L Median - APB      Wrist APB 2.9 9.5 5.2 100 Wrist - APB 80        Elbow APB 7.3 5.3 5.9 55.3 Elbow - Wrist 240 4.4 55   R Ulnar - ADM      Wrist ADM 2.1 9.0 5.8 100 Wrist -         B. Elbow ADM 6.0 9.0 5.6 99.7 B. Elbow - Wrist 260 3.9 67      A. Elbow ADM 7.7 8.6 5.6 95.1 A. Elbow - B. Elbow 120 1.7 70   L Ulnar - ADM      Wrist ADM 2.4 9.3 5.7 100 Wrist -         B. Elbow ADM 6.4 8.9 5.6 96.6 B. Elbow - Wrist 250 3.9 64      A. Elbow ADM 8.4 9.2 5.6 103 A. Elbow - B. Elbow 130 2.0 64   R Peroneal - EDB      Ankle EDB 3.7 4.5 4.6 100 Ankle - EDB 80        Fibular head EDB 9.0 3.5 5.6 78 Fibular head - Ankle 290 5.3 55      Pop fossa EDB 10.8 3.6 4.8 104 Pop fossa - Fibular head 100 1.8 55   R Tibial - AH      Ankle AH 4.9 7.4 3.8 100 Ankle - Ankle 80        Pop fossa AH 13.9 6.0 4.6 80.8 Pop fossa - Ankle 430 9.0 48       EMG         EMG Summary Table     Spontaneous MUAP Recruitment   Muscle IA Fib PSW Fasc Other Dur. Dur Amp Dur Polys Pattern Effort   R. Rectus femoris N None None None . _NFT_ _NFT_ N N N N Max   R. Extensor digitorum brevis N None None None . _NFT_ _NFT_ N N N N Max   R. Abductor hallucis N None None None . _NFT_ _NFT_ N N N N Max                                                      INTERPRETATION  -Bilateral median motor nerve conduction study showed normal latency, amplitude, and conduction velocity  -Bilateral median sensory nerve conduction study showed normal peak latency and amplitude  -Bilateral ulnar motor nerve conduction study showed normal latency, amplitude, and conduction velocity  -Bilateral ulnar sensory  nerve conduction study showed normal peak latency and amplitude  -Bilateral radial sensory nerve conduction study showed normal peak latency and amplitude  -Bilateral combined sensory index was non significant   -Right superficial peroneal sensory nerve conduction study showed normal peak latency and amplitude  -Right sural sensory nerve conduction study showed normal peak latency and amplitude  -Right peroneal motor nerve conduction study showed normal latency, amplitude, and conduction velocity  -Right tibial motor nerve conduction study showed normal latency, amplitude, and conduction velocity  -Needle EMG examination performed to above mentioned muscles     IMPRESSION  NORMAL Study  2. There is no electrodiagnostic evidence of a median, ulnar, or radial neuropathy of either upper extremity. There was no evidence of a peroneal, tibial, sural, or superficial peroneal neuropathy.  There was no evidence of myopathy or a radiculopathy of the L2-S1 nerve roots     PLAN  Discussed in detail for greater than 30 minutes about diagnosis and treatment plan    1. Follow up with referring provider: Dr. Alfon[facility]*  2. Handouts on stretching and exercise provided. Rec focus on water, magnesium, and potassium intake  3. This study is good for one year. If symptoms worsen or do not improve, please re-consult.    Rosana Leavitt M.D.  Physical Medicine and Rehab

## 2024-09-12 ENCOUNTER — PATIENT MESSAGE (OUTPATIENT)
Dept: NEUROLOGY | Facility: CLINIC | Age: 19
End: 2024-09-12
Payer: COMMERCIAL

## 2024-09-13 ENCOUNTER — HOSPITAL ENCOUNTER (OUTPATIENT)
Dept: RADIOLOGY | Facility: HOSPITAL | Age: 19
Discharge: HOME | End: 2024-09-13
Attending: PSYCHIATRY & NEUROLOGY
Payer: COMMERCIAL

## 2024-09-13 DIAGNOSIS — G25.82 STIFF-MAN SYNDROME: ICD-10-CM

## 2024-09-13 DIAGNOSIS — M25.60 GENERALIZED STIFFNESS: ICD-10-CM

## 2024-09-13 PROCEDURE — 72200 X-RAY EXAM SI JOINTS: CPT | Mod: 26,,, | Performed by: RADIOLOGY

## 2024-09-13 PROCEDURE — 72200 X-RAY EXAM SI JOINTS: CPT | Mod: TC,FY,PO

## 2024-09-18 DIAGNOSIS — R73.03 PREDIABETES: ICD-10-CM

## 2024-09-23 DIAGNOSIS — M25.60 GENERALIZED STIFFNESS: Primary | ICD-10-CM

## 2024-09-23 DIAGNOSIS — G25.82 STIFF-MAN SYNDROME: ICD-10-CM

## 2024-09-25 ENCOUNTER — TELEPHONE (OUTPATIENT)
Dept: NEUROLOGY | Facility: CLINIC | Age: 19
End: 2024-09-25
Payer: COMMERCIAL

## 2024-11-11 ENCOUNTER — TELEPHONE (OUTPATIENT)
Dept: NEUROLOGY | Facility: CLINIC | Age: 19
End: 2024-11-11
Payer: COMMERCIAL

## 2024-11-11 NOTE — TELEPHONE ENCOUNTER
PT called back I informed him why we have to remove his appointment due to the provider Wednesday slot being for memory patient's. PT under stands and will be referred to General Neuro

## 2024-11-11 NOTE — TELEPHONE ENCOUNTER
Called PT today to informed them that they need to be rescheduled with another provider and removed Wednesday due to Wednesday being for memory clinic. PT did not  left a message.

## 2024-11-11 NOTE — TELEPHONE ENCOUNTER
----- Message from Carol sent at 11/11/2024  1:47 PM CST -----  Regarding: Consult/Advisory  Contact: pt @ [phone] (home)  Consult/Advisory     Name Of Caller: Nahum Alfaro    Contact Preference: [phone] (home)     Nature of call: message is for Donald, pt missed your call and asking for a call back

## 2024-12-27 ENCOUNTER — TELEPHONE (OUTPATIENT)
Dept: NEUROSURGERY | Facility: CLINIC | Age: 19
End: 2024-12-27
Payer: COMMERCIAL

## 2024-12-27 ENCOUNTER — PATIENT MESSAGE (OUTPATIENT)
Facility: CLINIC | Age: 19
End: 2024-12-27
Payer: COMMERCIAL

## 2024-12-27 ENCOUNTER — TELEPHONE (OUTPATIENT)
Dept: PEDIATRIC NEUROLOGY | Facility: CLINIC | Age: 19
End: 2024-12-27
Payer: COMMERCIAL

## 2024-12-27 NOTE — TELEPHONE ENCOUNTER
----- Message from Paige sent at 12/27/2024 11:53 AM CST -----  Contact: Alina (Mother)  Patient is requesting a call back regarding scheduling - asking why appts keep getting canceled. Please call back at [phone]

## 2024-12-27 NOTE — TELEPHONE ENCOUNTER
Consulted with mom about appointment informed her that he needs to be seen by neuro muscular,gave her the number and mom verbalized understanding.

## 2024-12-27 NOTE — TELEPHONE ENCOUNTER
Spoke with patients mom regarding multiple cancelled appointments. Advised I will send information to supervisor for Neuro for a follow up regarding this issue. Pts mom v/u

## 2025-01-06 ENCOUNTER — TELEPHONE (OUTPATIENT)
Dept: NEUROLOGY | Facility: CLINIC | Age: 20
End: 2025-01-06
Payer: COMMERCIAL

## 2025-01-06 DIAGNOSIS — M25.60 GENERALIZED STIFFNESS: Primary | ICD-10-CM

## 2025-01-06 NOTE — TELEPHONE ENCOUNTER
----- Message from Paige sent at 1/6/2025  3:53 PM CST -----  Contact: satinder - mother  Type:  Patient Requesting Referral    Who Called:Nahum Alfaro  Does the patient already have the specialty appointment scheduled?:no    Referral to What Specialty:neurology  Reason for Referral:stiff person syndrome  Does the patient want the referral with a specific physician?:  Ashutosh Costa MD     Is the specialist an [facility]non  Patient Requesting a Response?:yes  Would the patient rather a call back or a response via [employer]? Call back  Best Call Back Number:[phone]  Additional Information: n/a

## 2025-01-06 NOTE — TELEPHONE ENCOUNTER
Spoke with pts mother. She asked that a referral be sent to Dr. Costa. Advised that I would let the provider know and get it faxed over. She verbalized understanding.

## 2025-01-07 NOTE — TELEPHONE ENCOUNTER
Called patients mother and advised that the referral was faxed over and transmission was successful. She verbalized understanding.

## 2025-01-24 ENCOUNTER — PATIENT MESSAGE (OUTPATIENT)
Dept: NEUROLOGY | Facility: CLINIC | Age: 20
End: 2025-01-24
Payer: COMMERCIAL

## 2025-02-24 ENCOUNTER — TELEPHONE (OUTPATIENT)
Dept: NEUROLOGY | Facility: CLINIC | Age: 20
End: 2025-02-24
Payer: COMMERCIAL

## 2025-02-24 ENCOUNTER — PATIENT MESSAGE (OUTPATIENT)
Dept: NEUROLOGY | Facility: CLINIC | Age: 20
End: 2025-02-24
Payer: COMMERCIAL

## 2025-02-25 ENCOUNTER — TELEPHONE (OUTPATIENT)
Dept: NEUROLOGY | Facility: CLINIC | Age: 20
End: 2025-02-25
Payer: COMMERCIAL

## 2025-02-25 NOTE — TELEPHONE ENCOUNTER
LVM for the patient's mother. Offered to help set up next available appointment with Dr Yousif Connell for stiff person's r/o. Left direct contact information and clinic number as well.

## 2025-02-27 ENCOUNTER — TELEPHONE (OUTPATIENT)
Dept: NEUROLOGY | Facility: CLINIC | Age: 20
End: 2025-02-27
Payer: COMMERCIAL

## 2025-02-27 NOTE — TELEPHONE ENCOUNTER
----- Message from Med Assistant Sky sent at 2/26/2025  4:27 PM CST -----  Regarding: FW: Missed call  Contact: Alina/mother [phone]    ----- Message -----  From: Anil Lawrence  Sent: 2/25/2025   3:31 PM CST  To: [facility] Staff  Subject: Missed call                                      Patient's mother Alina is returning a missed called from Bárbara Ellis RN in the office. Please call back as soon as possible.

## 2025-03-27 ENCOUNTER — TELEPHONE (OUTPATIENT)
Dept: PAIN MEDICINE | Facility: CLINIC | Age: 20
End: 2025-03-27
Payer: COMMERCIAL

## 2025-03-27 NOTE — PROGRESS NOTES
Chronic Pain Note    Referring Physician: No ref. provider found    PCP: TERRELL Chaves MD    Chief Complaint:   Chief Complaint   Patient presents with    Low-back Pain        SUBJECTIVE:  Interval History (3/28/2025):  Patient Nahum Alfaro presents today for follow-up visit.  Patient   Is being seen today for lower back pain which has been ongoing for 8-9 months.  He denies any injuries.  He feels like it is getting worse with time and feels it it is because he has to lift up on a lot of heavy equipment at work.  He works in a plant.  He rates his pain a 2/10 today.  He states he has done physical therapy in the past but it has only provided minimal relief.  He is not currently taking any medications for his symptoms.  He denies any radiculopathy.  Pain is worse with prolonged standing and better whenever he lays down.  He also asked if he can get limitations written for what he can and can not do her lift at work.  Patient denies night fever/night sweats, urinary incontinence, bowel incontinence, significant weight loss and significant motor weakness.   Patient denies any other complaints or concerns at this time.      4/29/2024  Nahum Alfaro is a 19 y.o. male who presents to the clinic for the evaluation of neck pain.  He is self referred.  He is past medical history of autism depression ADHD obesity ADDIE multiple other medical comorbidities as listed in his chart.  The pain started 4-6 weeks ago without any injury or trauma and symptoms have been unchanged.The pain is located in the base of the cervical spine without any radiation.  The pain is described as  stiff  and is rated as 5/10. The pain is rated with a score of  5/10 on the BEST day and a score of 5/10 on the WORST day.  Symptoms interfere with daily activity. The pain is exacerbated by movement.  The pain is mitigated by nothing.  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    Patient denies night fever/night sweats, urinary incontinence,  bowel incontinence, significant weight loss, significant motor weakness, and loss of sensations.    Pain Disability Index Review:         3/28/2025    10:42 AM   Last 3 PDI Scores   Pain Disability Index (PDI) 14       Non-Pharmacologic Treatments:  Physical Therapy/Home Exercise: no  Ice/Heat:no  TENS: no  Acupuncture: no  Massage: no  Chiropractic: no    Other: no      Pain Medications:  - Opioids:  None recently  - Adjuvant Medications:  Wellbutrin, Paxil, ibuprofen  - Anti-Coagulants:  None     report:  Reviewed and consistent with medication use as prescribed.    Pain Procedures:   Denies      Imagin2024 cervical xray  FINDINGS:  The vertebral bodies demonstrate a normal height and alignment.  The disc space heights appear to be relatively well maintained.  No prevertebral soft tissue swelling.  No osseous encroachment suggested upon the neural foraminal canals.  The C1-2 articulation is unremarkable in appearance.     Impression:     As above    2024 [facility] xray  FINDINGS: No evidence of acute fracture or dislocation.  Normal mineralization.  Soft tissues are unremarkable.  No significant degenerative change.        Impression:   No acute findings.       X-ray lumbar spine 2023:  The vertebral bodies demonstrate a normal height and alignment. The disc space heights are well maintained. No significant facet arthropathy suggested. Prominent stool noted throughout the right colon.         Past Medical History:   Diagnosis Date    ADHD (attention deficit hyperactivity disorder)     Anxiety     IBS (irritable bowel syndrome)      Past Surgical History:   Procedure Laterality Date    TYMPANOSTOMY TUBE PLACEMENT Bilateral      Social History     Socioeconomic History    Marital status: Single   Tobacco Use    Smoking status: Never     Passive exposure: Never    Smokeless tobacco: Never   Substance and Sexual Activity    Alcohol use: No    Drug use: No    Sexual activity: Not Currently      Partners: Female   Social History Narrative    4 cats, no smokers in household.     Social Drivers of Health     Financial Resource Strain: Low Risk  (5/16/2024)    Overall Financial Resource Strain (CARDIA)     Difficulty of Paying Living Expenses: Not hard at all   Food Insecurity: No Food Insecurity (5/16/2024)    Hunger Vital Sign     Worried About Running Out of Food in the Last Year: Never true     Ran Out of Food in the Last Year: Never true   Physical Activity: Insufficiently Active (5/16/2024)    Exercise Vital Sign     Days of Exercise per Week: 3 days     Minutes of Exercise per Session: 30 min   Stress: No Stress Concern Present (5/16/2024)    [employer] - Occupational Stress Questionnaire     Feeling of Stress : Not at all   Housing Stability: Unknown (5/16/2024)    Housing Stability Vital Sign     Unable to Pay for Housing in the Last Year: No     Family History   Problem Relation Name Age of Onset    No Known Problems Mother      No Known Problems Father      No Known Problems Brother         Review of patient's allergies indicates:   Allergen Reactions    Lactose Other (See Comments) and Rash     Abd pain.  Abd pain.  Abd pain.       Current Outpatient Medications   Medication Sig    AZSTARYS 52.3 mg- 10.4 mg Cap Take 1 capsule by mouth every morning.    buPROPion (WELLBUTRIN XL) 300 MG 24 hr tablet Take 1 tablet every morning to help with depression.    cloNIDine (CATAPRES) 0.3 MG tablet Take 0.3 mg by mouth every evening.    metFORMIN (GLUCOPHAGE-XR) 500 MG ER 24hr tablet Take 2 tablets (1,000 mg total) by mouth 2 (two) times a day.    methylphenidate HCl 54 MG CR tablet Take 54 mg by mouth every morning.    pantoprazole (PROTONIX) 20 MG tablet Take 1 tablet (20 mg total) by mouth once daily.    paroxetine (PAXIL-CR) 25 MG 24 hr tablet Take 1 tablet every evening to help with anxiety.    baclofen (LIORESAL) 20 MG tablet Take 1 tablet (20 mg total) by mouth 3 (three)  "times daily.    meloxicam (MOBIC) 15 MG tablet Take 1 tablet (15 mg total) by mouth once daily.    methocarbamoL (ROBAXIN) 500 MG Tab Take 1 tablet (500 mg total) by mouth nightly as needed.     No current facility-administered medications for this visit.       Review of Systems     GENERAL:  No weight loss, malaise or fevers.  HEENT:   No recent changes in vision or hearing  NECK:  Negative for lumps, no difficulty with swallowing.  RESPIRATORY:  Negative for cough, wheezing or shortness of breath, patient denies any recent URI.  CARDIOVASCULAR:  Negative for chest pain, leg swelling or palpitations.  GI:  Negative for abdominal discomfort, blood in stools or black stools or change in bowel habits.  MUSCULOSKELETAL:  See HPI.  SKIN:  Negative for lesions, rash, and itching.  PSYCH:  No mood disorder or recent psychosocial stressors.  Patients sleep is not disturbed secondary to pain.  HEMATOLOGY/LYMPHOLOGY:  Negative for prolonged bleeding, bruising easily or swollen nodes.  Patient is not currently taking any anti-coagulants  NEURO:   No history of headaches, syncope, paralysis, seizures or tremors.  All other reviewed and negative other than HPI.    OBJECTIVE:    BP (!) 96/57   Pulse 65   Ht 5' 8" (1.727 m)   Wt 102.5 kg (225 lb 15.5 oz)   BMI 34.36 kg/m²         Physical Exam    GENERAL: Well appearing, in no acute distress, alert and oriented x3.  PSYCH:  Mood and affect appropriate.  SKIN: Skin color, texture, turgor normal, no rashes or lesions.  HEAD/FACE:  Normocephalic, atraumatic. Cranial nerves grossly intact.  NECK: No pain to palpation over the cervical paraspinous muscles. Spurling Negative.  Minimal pain with neck flexion, extension, and lateral flexion.   CV: RRR with palpation of the radial artery.  PULM: No evidence of respiratory difficulty, symmetric chest rise.  GI:  Soft and non-tender.  BACK:  Kyphotic posture.  No pain to palpation over the facet joints of the lumbar spine or spinous " processes. Normal range of motion without pain reproduction.negative SLR. No pain with extension or flexion or facet loading.  EXTREMITIES:  No deformities, edema, or skin discoloration. Good capillary refill.  MUSCULOSKELETAL: Shoulder provocative maneuvers are negative.  Bilateral upper and lower extremity strength is normal and symmetric.  No atrophy or tone abnormalities are noted.  NEURO: Bilateral upper and lower extremity coordination and muscle stretch reflexes are physiologic and symmetric.  Plantar response are downgoing. No clonus.  Negative Dragan.  No loss of sensation is noted.  GAIT: normal.      LABS:  Lab Results   Component Value Date    WBC 8.68 06/07/2024    HGB 14.0 06/07/2024    HCT 42.1 06/07/2024    MCV 90 06/07/2024     06/07/2024       CMP  Sodium   Date Value Ref Range Status   06/07/2024 138 136 - 145 mmol/L Final   06/07/2024 138 136 - 145 mmol/L Final     Potassium   Date Value Ref Range Status   06/07/2024 4.0 3.5 - 5.1 mmol/L Final   06/07/2024 4.0 3.5 - 5.1 mmol/L Final     Chloride   Date Value Ref Range Status   06/07/2024 105 95 - 110 mmol/L Final   06/07/2024 105 95 - 110 mmol/L Final     CO2   Date Value Ref Range Status   06/07/2024 25 23 - 29 mmol/L Final   06/07/2024 25 23 - 29 mmol/L Final     Glucose   Date Value Ref Range Status   06/07/2024 101 70 - 110 mg/dL Final   06/07/2024 101 70 - 110 mg/dL Final     BUN   Date Value Ref Range Status   06/07/2024 10 6 - 20 mg/dL Final   06/07/2024 10 6 - 20 mg/dL Final     Creatinine   Date Value Ref Range Status   06/07/2024 0.7 0.5 - 1.4 mg/dL Final   06/07/2024 0.7 0.5 - 1.4 mg/dL Final     Calcium   Date Value Ref Range Status   06/07/2024 9.1 8.7 - 10.5 mg/dL Final   06/07/2024 9.1 8.7 - 10.5 mg/dL Final     Total Protein   Date Value Ref Range Status   06/07/2024 7.2 6.0 - 8.4 g/dL Final   06/07/2024 7.2 6.0 - 8.4 g/dL Final     Albumin   Date Value Ref Range Status   06/07/2024 3.6 3.5 - 5.2 g/dL Final   06/07/2024  3.6 3.5 - 5.2 g/dL Final     Total Bilirubin   Date Value Ref Range Status   06/07/2024 0.4 0.1 - 1.0 mg/dL Final     Comment:     For infants and newborns, interpretation of results should be based  on gestational age, weight and in agreement with clinical  observations.    Premature Infant recommended reference ranges:  Up to 24 hours.............<8.0 mg/dL  Up to 48 hours............<12.0 mg/dL  3-5 days..................<15.0 mg/dL  6-29 days.................<15.0 mg/dL     06/07/2024 0.4 0.1 - 1.0 mg/dL Final     Comment:     For infants and newborns, interpretation of results should be based  on gestational age, weight and in agreement with clinical  observations.    Premature Infant recommended reference ranges:  Up to 24 hours.............<8.0 mg/dL  Up to 48 hours............<12.0 mg/dL  3-5 days..................<15.0 mg/dL  6-29 days.................<15.0 mg/dL       Alkaline Phosphatase   Date Value Ref Range Status   06/07/2024 93 55 - 135 U/L Final   06/07/2024 93 55 - 135 U/L Final     AST   Date Value Ref Range Status   06/07/2024 18 10 - 40 U/L Final   06/07/2024 18 10 - 40 U/L Final     ALT   Date Value Ref Range Status   06/07/2024 31 10 - 44 U/L Final   06/07/2024 31 10 - 44 U/L Final     Anion Gap   Date Value Ref Range Status   06/07/2024 8 8 - 16 mmol/L Final   06/07/2024 8 8 - 16 mmol/L Final     eGFR if    Date Value Ref Range Status   01/20/2020 SEE COMMENT >60 mL/min/1.73 m^2 Final     eGFR if non    Date Value Ref Range Status   01/20/2020 SEE COMMENT >60 mL/min/1.73 m^2 Final     Comment:     Calculation used to obtain the estimated glomerular filtration  rate (eGFR) is the CKD-EPI equation.   Test not performed.  GFR calculation is only valid for patients   18 and older.         Lab Results   Component Value Date    HGBA1C 5.2 04/28/2023             ASSESSMENT: 19 y.o. year old male with neck pain, consistent with     1. Dorsalgia  X-Ray Lumbar Complete  Including Flex And Ext    Ambulatory Referral/Consult to Physical Therapy      2. Generalized stiffness  meloxicam (MOBIC) 15 MG tablet            PLAN:   - Interventions:  None at this time    - Anticoagulation use:  None    - Medications: I have stressed the importance of physical activity and a home exercise plan to help with pain and improve health. and Patient can continue with medications for now since they are providing benefits, using them appropriately, and without side effects.     Refill meloxicam 15mg daily prn.   Do not combine with other NSAIDs such as ibuprofen, aleve, advil. Take with food. If any GI upset, stop medication and contact office.   We have previously discussed potential deleterious side effects of NSAIDs on the cardiovascular, gastrointestinal and renal systems. We have discussed judicious use of this medication.      Trial on robaxin 500mg QHS prn. We have discussed potential deleterious side effects associated with this medication including  dizziness, drowsiness, stomach upset, nausea vomiting or blurred vision.  Patient expresses understanding.        - Therapy:  recommend PT- pt declines for now  Will refer to [facility] for [facility]    - Labs:  Reviewed    - Imaging: Reviewed available imaging with patient and answered any questions they had regarding study.  Will get lumbar xray today    - Consults/Referrals:  None at this time, likely physical therapy    - Records:  Reviewed/Obtain old records from outside physicians and imaging    - Follow up visit: return to clinic as needed; will contact with xray results on mychart    - Counseled patient regarding the importance of activity modification and physical therapy    - This condition does not require this patient to take time off of work, and the primary goal of our Pain Management services is to improve the patient's functional capacity.    - Patient Questions: Answered all of the patient's questions regarding diagnosis, therapy, and  treatment        The above plan and management options were discussed at length with patient. Patient is in agreement with the above and verbalized understanding.    I discussed the goals of interventional chronic pain management with the patient on today's visit.  I explained the utility of injections for diagnostic and therapeutic purposes.  We discussed a multimodal approach to pain including treating the patient's given worst pain at any given time.  We will use a systematic approach to addressing pain.  We will also adopt a multimodal approach that includes injections, adjuvant medications, physical therapy, at times psychiatry.  There may be a limited role for opioid use intermittently in the treatment of pain, more particularly for acute pain although no one approach can be used as a sole treatment modality.    I emphasized the importance of regular exercise, core strengthening and stretching, diet and weight loss as a cornerstone of long-term pain management.      Preeti Bauman NP  Interventional Pain Management  [facility]    Disclaimer:  This note was prepared using voice recognition system and is likely to have sound alike errors that may have been overlooked even after proof reading.  Please call me with any questions

## 2025-03-28 ENCOUNTER — HOSPITAL ENCOUNTER (OUTPATIENT)
Dept: RADIOLOGY | Facility: HOSPITAL | Age: 20
Discharge: HOME | End: 2025-03-28
Attending: NURSE PRACTITIONER
Payer: COMMERCIAL

## 2025-03-28 ENCOUNTER — OFFICE VISIT (OUTPATIENT)
Dept: PAIN MEDICINE | Facility: CLINIC | Age: 20
End: 2025-03-28
Payer: COMMERCIAL

## 2025-03-28 VITALS
SYSTOLIC BLOOD PRESSURE: 96 MMHG | BODY MASS INDEX: 34.25 KG/M2 | DIASTOLIC BLOOD PRESSURE: 57 MMHG | WEIGHT: 226 LBS | HEIGHT: 68 IN | HEART RATE: 65 BPM

## 2025-03-28 DIAGNOSIS — M54.9 DORSALGIA: Primary | ICD-10-CM

## 2025-03-28 DIAGNOSIS — M54.9 DORSALGIA: ICD-10-CM

## 2025-03-28 DIAGNOSIS — M25.60 GENERALIZED STIFFNESS: ICD-10-CM

## 2025-03-28 PROCEDURE — 72114 X-RAY EXAM L-S SPINE BENDING: CPT | Mod: 26,,, | Performed by: RADIOLOGY

## 2025-03-28 PROCEDURE — 72114 X-RAY EXAM L-S SPINE BENDING: CPT | Mod: TC

## 2025-03-28 PROCEDURE — 99999 PR PBB SHADOW E&M-EST. PATIENT-LVL IV: CPT | Mod: PBBFAC,,, | Performed by: NURSE PRACTITIONER

## 2025-03-28 RX ORDER — MELOXICAM 15 MG/1
15 TABLET ORAL DAILY
Qty: 30 TABLET | Refills: 0 | Status: SHIPPED | OUTPATIENT
Start: 2025-03-28

## 2025-03-28 RX ORDER — METHOCARBAMOL 500 MG/1
500 TABLET, FILM COATED ORAL NIGHTLY PRN
Qty: 30 TABLET | Refills: 0 | Status: SHIPPED | OUTPATIENT
Start: 2025-03-28 | End: 2025-05-27

## 2025-03-29 ENCOUNTER — PATIENT MESSAGE (OUTPATIENT)
Dept: PAIN MEDICINE | Facility: CLINIC | Age: 20
End: 2025-03-29
Payer: COMMERCIAL

## 2025-03-31 ENCOUNTER — RESULTS FOLLOW-UP (OUTPATIENT)
Dept: PAIN MEDICINE | Facility: CLINIC | Age: 20
End: 2025-03-31

## 2025-04-03 ENCOUNTER — PATIENT MESSAGE (OUTPATIENT)
Dept: NEUROLOGY | Facility: CLINIC | Age: 20
End: 2025-04-03
Payer: COMMERCIAL

## 2025-04-04 ENCOUNTER — CLINICAL SUPPORT (OUTPATIENT)
Dept: REHABILITATION | Facility: HOSPITAL | Age: 20
End: 2025-04-04
Payer: COMMERCIAL

## 2025-04-04 DIAGNOSIS — M54.50 CHRONIC MIDLINE LOW BACK PAIN WITHOUT SCIATICA: Primary | ICD-10-CM

## 2025-04-04 DIAGNOSIS — R53.1 DECREASED STRENGTH, ENDURANCE, AND MOBILITY: ICD-10-CM

## 2025-04-04 DIAGNOSIS — Z74.09 DECREASED STRENGTH, ENDURANCE, AND MOBILITY: ICD-10-CM

## 2025-04-04 DIAGNOSIS — R68.89 DECREASED STRENGTH, ENDURANCE, AND MOBILITY: ICD-10-CM

## 2025-04-04 DIAGNOSIS — M54.9 DORSALGIA: ICD-10-CM

## 2025-04-04 DIAGNOSIS — G89.29 CHRONIC MIDLINE LOW BACK PAIN WITHOUT SCIATICA: Primary | ICD-10-CM

## 2025-04-04 DIAGNOSIS — R26.9 ABNORMALITY OF GAIT AND MOBILITY: ICD-10-CM

## 2025-04-04 PROCEDURE — 97162 PT EVAL MOD COMPLEX 30 MIN: CPT | Performed by: PHYSICAL THERAPIST

## 2025-04-04 NOTE — PROGRESS NOTES
Outpatient Rehab    Physical Therapy Evaluation    Patient Name: Nahum Alfaro  MRN: [id number]  YOB: 2005  Encounter Date: 4/4/2025    Therapy Diagnosis:   Encounter Diagnoses   Name Primary?    Chronic midline low back pain without sciatica Yes    Dorsalgia     Decreased strength, endurance, and mobility     Abnormality of gait and mobility      Physician: Preeti Bauman NP    Physician Orders: Eval and Treat  Medical Diagnosis: Dorsalgia    Visit # / Visits Authorized:  1 / 1  Insurance Authorization Period: 3/28/2025 to 12/31/2025  Date of Evaluation: 4/4/2025  Plan of Care Certification: 4/4/2025 to 7/3/2025     Time In: 0845   Time Out: 0930  Total Time: 45   Total Billable Time:  45    Intake Outcome Measure for FOTO Survey    Therapist reviewed FOTO scores for Nahum Alfaro on 4/4/2025.   FOTO report - see Media section or FOTO account episode details.     Intake Score: 51 (goal of 65)%         Subjective   History of Present Illness  Nahum is a 19 y.o. male                  History of Present Condition/Illness: Patient reports that he has had stiffness his entire life. He had to wear AFO's as a child to help with dorsiflexion of the feet. He no longer needs them but still has to compensate with ER of B LE and trunk lean compensation. Patient reports he has a hard time bending forward to put shoes and socks on. His wrists do not extend past neutral. Patient works as a , which requires him to lift 100# or more at times. He is unable to use proper mechanics due to stiffness, which causes his back to really hurt. He also has to climb a lot of stairs at work, which is also difficult for him.     Pain     Patient reports a current pain level of 0/10. Pain at best is reported as 0/10. Pain at worst is reported as 10/10.   Location: back pain  Clinical Progression (since onset): Worsening  Pain Qualities: Sharp, Aching  Pain-Relieving Factors: Lying down, Rest  Pain-Aggravating  Factors: Bending, Lifting, Standing, Squatting, Kneeling, Twisting, Stair climbing           Past Medical History/Physical Systems Review:   Nahum Alfaro  has a past medical history of ADHD (attention deficit hyperactivity disorder), Anxiety, and IBS (irritable bowel syndrome).    Nahum Alfaro  has a past surgical history that includes Tympanostomy tube placement (Bilateral, 2006).    Nahum has a current medication list which includes the following prescription(s): azstarys, bupropion, clonidine, meloxicam, metformin, methocarbamol, methylphenidate hcl, pantoprazole, and paroxetine.    Review of patient's allergies indicates:   Allergen Reactions    Lactose Other (See Comments) and Rash     Abd pain.  Abd pain.  Abd pain.        Objective        AROM:  Top Tier Goal   Cervical Flexion Dysfunctional - Nonpainful Functional - Nonpainful   Cervical Extension Dysfunctional - Nonpainful Functional - Nonpainful   Cervical Rotation Dysfunctional - Nonpainful Functional - Nonpainful   Upper Extremity One (Medial Rotation) Dysfunctional - Nonpainful Functional - Nonpainful   Upper Extremity Two (External Rotation) Dysfunctional - Nonpainful Functional - Nonpainful   Multi-Segmental Flexion Dysfunctional - Nonpainful Functional - Nonpainful   Multi-Segmental Extension Dysfunctional - Nonpainful Functional - Nonpainful   Multi-Segmental Rotation Dysfunctional - Nonpainful Functional - Nonpainful   Single Leg Stance Dysfunctional - Nonpainful Functional - Nonpainful   Arms Down Deep Squat Dysfunctional - Nonpainful Functional - Nonpainful            STRENGTH:   L/E MMT Right Left Dysfunction with Movement Goal   Hip Flexion  4-/5 4-/5  4+/5 B   Hip Extension  NT NT  4+/5 B   Hip Abduction  3+/5 3+/5  4+/5 B   Knee Extension 4/5 4/5  5/5 B   Knee Flexion 4/5 4/5  5/5 B   Ankle DF 4+/5 4+/5 In test position  5/5 B   Ankle PF 4/5 4/5  5/5 B        JOINT MOBILITY:   Joint Motion Right Mobility  (spine) Left Mobility Goal    Thoracic PA  [x] Hypo     [] Normal     [] Hyper --- Normal   Lumbar PA [x] Hypo     [] Normal     [] Hyper --- Normal   **Joint stiffness/hypomobility noted throughout multiple joints grossly, including bilateral wrists, shoulders, knees, and ankles.      SENSATION:  Intact to Light Touch          GAIT ANALYSIS The patient ambulated with the following assistive device: none; the pt presents with the following gait abnormalities: bradykinetic, increased TAWNY, decreased step length bilateral, decreased heel strike bilateral, decreased push off bilateral, decreased hip extension bilateral, hip hike bilateral, and bilateral ER.    FUNCTIONAL MOVEMENT PATTERNS  Movement Analysis Notes   Squat DYSFUNCTIONAL, PAINFUL     Step Down  DYSFUNCTIONAL, NONPAINFUL    Single leg calf raises  DYSFUNCTIONAL, NONPAINFUL R:   L:        BALANCE:   Right   (seconds) Left  (seconds) Pain/dysfunction Noted Goal   Narrow Base of Support 60 --- But requires ER of LE's, initial postural sway 60+ seconds   Tandem Stance NT NT  60+ seconds   Single Leg Stance NT NT  60+ seconds       Treatment:     To begin next session.     Assessment & Plan   Assessment  Nahum presents with a condition of Moderate complexity.   Presentation of Symptoms: Evolving  Will Comorbidities Impact Care: Yes            Patient Goal for Therapy (PT): Pt reported goals are to decrease overall pain levels in order to return to prior functional level.  Prognosis: Fair  Prognosis Details: Anticipated Barriers for therapy: co-morbidities, chronicity of condition, and occupation   Assessment Details: Pt presents with impairments in the following categories: IMPAIRMENTS: ROM, strength, joint mobility, muscle length, posture, gait mechanics, core strength and stability, and functional movement patterns. Pt will benefit from skilled outpatient Physical Therapy to address the deficits stated above, provide pt/family education, and to maximize pt's level of independence.       Plan  From a physical therapy perspective, the patient would benefit from: Skilled Rehab Services                Visit Frequency: 2 times Per Week for 12 Weeks.       This plan was discussed with Patient.   Discussion participants: Agreed Upon Plan of Care  Plan details: Outpatient Physical Therapy to include any combination of the following interventions: virtual visits, dry needling, modalities, electrical stimulation (IFC, Pre-Mod, Attended with Functional Dry Needling), Cervical/Lumbar Traction, Gait Training, Manual Therapy, Neuromuscular Re-ed, Patient Education, Self Care, Therapeutic Exercise, and Therapeutic Activites           Patient's spiritual, cultural, and educational needs considered and patient agreeable to plan of care and goals.     Education  Education was done with Patient.  The patient Demonstrates understanding and Verbalizes understanding.         PURPOSE: Patient educated on the impairments noted above and the effects of physical therapy intervention to improve overall condition and QOL.  EXERCISE: Patient was educated on all the above exercise prior/during/after for proper posture, positioning, and execution for safe performance with home exercise program.  STRENGTH: Patient educated on the importance of improved core and extremity strength in order to improve alignment of the spine and extremities with static positions and dynamic movement.  GAIT & BALANCE: Patient educated on the importance of strong core and lower extremity musculature in order to improve both static and dynamic balance, improve gait mechanics, reduce fall risk and improve household and community mobility.  POSTURE: Patient educated on postural awareness to reduce stress and maintain optimal alignment of the spine with static positions and dynamic movement  POLICIES: Educated patient on scheduling, cancelation and no-show policy.        Goals:   Active       Long Term Goals       Pt will report worst pain of 4/10 in  order to progress toward max functional ability and improve quality of life                Start:  04/04/25    Expected End:  06/27/25            Patient will improve strength to at least 4+/5 grossly,  in order to improve functional independence and quality of life.         Start:  04/04/25    Expected End:  06/27/25            Patient will demonstrate improved function as indicated by a score of greater than or equal to 65 out of 100 on FOTO.                Start:  04/04/25    Expected End:  06/27/25            Patient will be able to demonstrate improved body mechanics during lifting and report decreased pain levels.       Start:  04/04/25    Expected End:  06/27/25               Short Term Goals       Pt will report worst pain of 8/10 in order to progress toward max functional ability and improve quality of life                Start:  04/04/25    Expected End:  05/16/25            Patient will improve strength by 1/2 a grade, in order to progress towards independence with functional activities.                Start:  04/04/25    Expected End:  05/16/25            Patient will correct postural deviations in sitting and standing, to decrease pain and promote long term stability.                 Start:  04/04/25    Expected End:  05/16/25            Patient will demonstrate independence with HEP in order to progress toward functional independence.        Start:  04/04/25    Expected End:  05/16/25                Elly Lozano, PT, DPT

## 2025-04-05 NOTE — PROGRESS NOTES
"Subjective:       Patient ID: Nahum Alfaro is a 19 y.o. male.    Chief Complaint: No chief complaint on file.    HPI The patient presented on 08/ 2024 for evaluation of " stiffness".  Mother present.   New issues: none.   Muscles Stiffness: continue present.   No SE with medications.     Review of Systems   Musculoskeletal:  Positive for back pain and neck stiffness.   All other systems reviewed and are negative.        Current Medications[1]    Past Medical History:   Diagnosis Date    ADHD (attention deficit hyperactivity disorder)     Anxiety     IBS (irritable bowel syndrome)      Past Surgical History:   Procedure Laterality Date    TYMPANOSTOMY TUBE PLACEMENT Bilateral 2006     Social History[2]    Past/Current Medical/Surgical History, Past/Current Social History,   Past/Current Family History and Past/Current Medications were reviewed in detail.    Objective:     VITAL SIGNS WERE REVIEWED    GENERAL APPEARANCE:     The patient looks comfortable.    BMI    No signs of respiratory distress.    Normal breathing pattern.    No dysmorphic features    Normal eye contact.       GENERAL MEDICAL EXAM:    HEENT:  Head is atraumatic normocephalic.     FUNDOSCOPIC (OPHTHALMOSCOPIC) EXAMINATION showed no disc edema (papilledema).      NECK: No JVD. No visible lesions or goiters.     CHEST-CARDIOPULMONARY: No cyanosis. No tachypnea. Normal respiratory effort.    NAIKEIV-ONWUTMELEDHZBWBI-NBYGOJLGCV: No jaundice. No stomas or lesions. No visible hernias. No catheters.     SKIN, HAIR, NAILS: No pathognomonic skin rash.No neurofibromatosis. No visible lesions.  No stigmata of autoimmune disease. No clubbing.    LIMBS: No varicose veins. No visible swelling.    MUSCULOSKELETAL: No visible deformities.No visible lesions.    Neurological Exam  Mental Status  Alert. Oriented to person, place, time and situation. Recent and remote memory are intact. Able to copy figure. Clock drawing is normal. Speech is normal. Language is " fluent with no aphasia. Attention and concentration are normal. Fund of knowledge is appropriate for level of education. Apraxia absent.    Cranial Nerves  CN II: Visual acuity is normal. Visual fields full to confrontation.  CN III, IV, VI: Extraocular movements intact bilaterally. Normal lids and orbits bilaterally. Pupils equal round and reactive to light bilaterally.  CN V: Facial sensation is normal.  CN VII: Full and symmetric facial movement.  CN VIII: Hearing is normal.  CN IX, X: Palate elevates symmetrically. Normal gag reflex.  CN XI: Shoulder shrug strength is normal.  CN XII: Tongue midline without atrophy or fasciculations.    Motor  Normal muscle bulk throughout. No fasciculations present. Increased muscle tone. No abnormal involuntary movements. Strength is 5/5 throughout all four extremities.  Very tight Hamstring muscles.   Limitation to wrist extension. .    Sensory  Sensation is intact to light touch, pinprick, vibration and proprioception in all four extremities.    Reflexes                                            Right                      Left  Brachioradialis                    1+                         1+  Biceps                                 1+                         1+  Triceps                                1+                         1+  Finger flex                           1+                         1+  Hamstring                            1+                         1+  Patellar                                1+                         1+  Achilles                                1+                         1+  Right Plantar: downgoing  Left Plantar: downgoing  Jaw jerk absent.  Right pathological reflexes: Dragan's absent. Ankle clonus absent.  Left pathological reflexes: Dragan's absent. Ankle clonus absent.   Right palmomental absent. Left palmomental absent.    Coordination    Finger-to-nose, rapid alternating movements and heel-to-shin normal bilaterally without  dysmetria.    Gait  Normal casual, toe, heel and tandem gait.        Lab Results   Component Value Date    WBC 8.68 06/07/2024    HGB 14.0 06/07/2024    HCT 42.1 06/07/2024    MCV 90 06/07/2024     06/07/2024     Sodium   Date Value Ref Range Status   06/07/2024 138 136 - 145 mmol/L Final   06/07/2024 138 136 - 145 mmol/L Final     Potassium   Date Value Ref Range Status   06/07/2024 4.0 3.5 - 5.1 mmol/L Final   06/07/2024 4.0 3.5 - 5.1 mmol/L Final     Chloride   Date Value Ref Range Status   06/07/2024 105 95 - 110 mmol/L Final   06/07/2024 105 95 - 110 mmol/L Final     CO2   Date Value Ref Range Status   06/07/2024 25 23 - 29 mmol/L Final   06/07/2024 25 23 - 29 mmol/L Final     Glucose   Date Value Ref Range Status   06/07/2024 101 70 - 110 mg/dL Final   06/07/2024 101 70 - 110 mg/dL Final     BUN   Date Value Ref Range Status   06/07/2024 10 6 - 20 mg/dL Final   06/07/2024 10 6 - 20 mg/dL Final     Creatinine   Date Value Ref Range Status   06/07/2024 0.7 0.5 - 1.4 mg/dL Final   06/07/2024 0.7 0.5 - 1.4 mg/dL Final     Calcium   Date Value Ref Range Status   06/07/2024 9.1 8.7 - 10.5 mg/dL Final   06/07/2024 9.1 8.7 - 10.5 mg/dL Final     Total Protein   Date Value Ref Range Status   06/07/2024 7.2 6.0 - 8.4 g/dL Final   06/07/2024 7.2 6.0 - 8.4 g/dL Final     Albumin   Date Value Ref Range Status   06/07/2024 3.6 3.5 - 5.2 g/dL Final   06/07/2024 3.6 3.5 - 5.2 g/dL Final     Total Bilirubin   Date Value Ref Range Status   06/07/2024 0.4 0.1 - 1.0 mg/dL Final     Comment:     For infants and newborns, interpretation of results should be based  on gestational age, weight and in agreement with clinical  observations.    Premature Infant recommended reference ranges:  Up to 24 hours.............<8.0 mg/dL  Up to 48 hours............<12.0 mg/dL  3-5 days..................<15.0 mg/dL  6-29 days.................<15.0 mg/dL     06/07/2024 0.4 0.1 - 1.0 mg/dL Final     Comment:     For infants and newborns,  "interpretation of results should be based  on gestational age, weight and in agreement with clinical  observations.    Premature Infant recommended reference ranges:  Up to 24 hours.............<8.0 mg/dL  Up to 48 hours............<12.0 mg/dL  3-5 days..................<15.0 mg/dL  6-29 days.................<15.0 mg/dL       Alkaline Phosphatase   Date Value Ref Range Status   06/07/2024 93 55 - 135 U/L Final   06/07/2024 93 55 - 135 U/L Final     AST   Date Value Ref Range Status   06/07/2024 18 10 - 40 U/L Final   06/07/2024 18 10 - 40 U/L Final     ALT   Date Value Ref Range Status   06/07/2024 31 10 - 44 U/L Final   06/07/2024 31 10 - 44 U/L Final     Anion Gap   Date Value Ref Range Status   06/07/2024 8 8 - 16 mmol/L Final   06/07/2024 8 8 - 16 mmol/L Final     eGFR if    Date Value Ref Range Status   01/20/2020 SEE COMMENT >60 mL/min/1.73 m^2 Final     eGFR if non    Date Value Ref Range Status   01/20/2020 SEE COMMENT >60 mL/min/1.73 m^2 Final     Comment:     Calculation used to obtain the estimated glomerular filtration  rate (eGFR) is the CKD-EPI equation.   Test not performed.  GFR calculation is only valid for patients   18 and older.       No results found for: "DNMTAONP35"    Lab Results   Component Value Date    TSH 1.652 04/28/2023    FREET4 1.07 09/29/2020     No results found in the last 24 hours.    LABORATORY EVALUATION    RADIOLOGY EVALUATION     NEUROPHYSIOLOGY EVALUATION     PATHOLOGY EVALUATION      NEUROCOGNITIVE AND NEUROPSYCHOLOGY EVALUATION     Reviewed the neuroimaging independently     Assessment:   19 Years old C. Male  with PMHX as above came of the evaluation of " Stiffness ".   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  Plan:   Patient has at lot of impediment to move at work - picking up " things more than 40 Lbs " /  Problems bending and getting up / lower back and legs muscles " very tight " and painful.     Already has an appointment with a Muscles skeletal " specialist in June 2025.   Add Valium 5 mg PO BID.  SE discussed.   No driving when takes Valium.     Baclofen - mild help.     X Ray Sacroiliaca joints - 03/ 2025 - non significant abnormalities.     Upoin questioning - possible similar situation with Father / They ( Mother and Patient ) will ask his Father about it.      ARGELIA antibodies - 0.07 ( 0.02 nmo / L ) .     NCS/ EMG: normal.      Labs: CBC / CMP / Lipids panel / [facility] - 2024 - non significant abnormalities.      Insuline random: 363 ( < 25 ).     MEDICAL/SURGICAL COMORBIDITIES     All relevant medical comorbidities noted and managed by primary care physician and medical care team.      HEALTHY LIFESTYLE AND PREVENTATIVE CARE    The patient to adhere to the age-appropriate health maintenance guidelines including screening tests and vaccinations.   The patient to adhere to  healthy lifestyle, optimal weight, exercise, healthy diet,   good sleep hygiene and avoiding drugs including smoking, alcohol and recreational drugs.    RTC: PRN.     Please do not hesitate to contact me with any updates, questions or concerns.    I spent a total of 20 minutes on the day of the visit.This includes face to face time and non-face to face time preparing to see the patient (eg, review of tests), obtaining and/or reviewing separately obtained history, documenting clinical information in the electronic or other health record, independently interpreting results and communicating results to the patient/family/caregiver, or care coordinator.    Alfonzo Bolanos MD  General Neurology.         [1]    Current Outpatient Medications:     AZSTARYS 52.3 mg- 10.4 mg Cap, Take 1 capsule by mouth every morning., Disp: , Rfl:     buPROPion (WELLBUTRIN XL) 300 MG 24 hr tablet, Take 1 tablet every morning to help with depression., Disp: , Rfl:     cloNIDine (CATAPRES) 0.3 MG tablet, Take 0.3 mg by mouth every evening., Disp: , Rfl:     meloxicam (MOBIC) 15 MG tablet, Take 1 tablet  (15 mg total) by mouth once daily., Disp: 30 tablet, Rfl: 0    metFORMIN (GLUCOPHAGE-XR) 500 MG ER 24hr tablet, Take 2 tablets (1,000 mg total) by mouth 2 (two) times a day., Disp: 360 tablet, Rfl: 2    methocarbamoL (ROBAXIN) 500 MG Tab, Take 1 tablet (500 mg total) by mouth nightly as needed., Disp: 30 tablet, Rfl: 0    methylphenidate HCl 54 MG CR tablet, Take 54 mg by mouth every morning., Disp: , Rfl:     pantoprazole (PROTONIX) 20 MG tablet, Take 1 tablet (20 mg total) by mouth once daily., Disp: 90 tablet, Rfl: 3    paroxetine (PAXIL-CR) 25 MG 24 hr tablet, Take 1 tablet every evening to help with anxiety., Disp: , Rfl:   [2]  Social History  Socioeconomic History    Marital status: Single   Tobacco Use    Smoking status: Never     Passive exposure: Never    Smokeless tobacco: Never   Substance and Sexual Activity    Alcohol use: No    Drug use: No    Sexual activity: Not Currently     Partners: Female   Social History Narrative    4 cats, no smokers in household.     Social Drivers of Health     Financial Resource Strain: Low Risk  (5/16/2024)    Overall Financial Resource Strain (CARDIA)     Difficulty of Paying Living Expenses: Not hard at all   Food Insecurity: No Food Insecurity (5/16/2024)    Hunger Vital Sign     Worried About Running Out of Food in the Last Year: Never true     Ran Out of Food in the Last Year: Never true   Physical Activity: Insufficiently Active (5/16/2024)    Exercise Vital Sign     Days of Exercise per Week: 3 days     Minutes of Exercise per Session: 30 min   Stress: No Stress Concern Present (5/16/2024)    [employer] - Occupational Stress Questionnaire     Feeling of Stress : Not at all   Housing Stability: Unknown (5/16/2024)    Housing Stability Vital Sign     Unable to Pay for Housing in the Last Year: No

## 2025-04-07 ENCOUNTER — OFFICE VISIT (OUTPATIENT)
Dept: NEUROLOGY | Facility: CLINIC | Age: 20
End: 2025-04-07
Payer: COMMERCIAL

## 2025-04-07 VITALS
DIASTOLIC BLOOD PRESSURE: 65 MMHG | HEIGHT: 68 IN | BODY MASS INDEX: 34.25 KG/M2 | WEIGHT: 226 LBS | HEART RATE: 87 BPM | SYSTOLIC BLOOD PRESSURE: 104 MMHG

## 2025-04-07 DIAGNOSIS — G25.82 STIFF-MAN SYNDROME: Primary | ICD-10-CM

## 2025-04-07 PROCEDURE — 1160F RVW MEDS BY RX/DR IN RCRD: CPT | Mod: CPTII,S$GLB,, | Performed by: PSYCHIATRY & NEUROLOGY

## 2025-04-07 PROCEDURE — 99999 PR PBB SHADOW E&M-EST. PATIENT-LVL III: CPT | Mod: PBBFAC,,, | Performed by: PSYCHIATRY & NEUROLOGY

## 2025-04-07 PROCEDURE — 3078F DIAST BP <80 MM HG: CPT | Mod: CPTII,S$GLB,, | Performed by: PSYCHIATRY & NEUROLOGY

## 2025-04-07 PROCEDURE — 99213 OFFICE O/P EST LOW 20 MIN: CPT | Mod: S$GLB,,, | Performed by: PSYCHIATRY & NEUROLOGY

## 2025-04-07 PROCEDURE — 3074F SYST BP LT 130 MM HG: CPT | Mod: CPTII,S$GLB,, | Performed by: PSYCHIATRY & NEUROLOGY

## 2025-04-07 PROCEDURE — 1159F MED LIST DOCD IN RCRD: CPT | Mod: CPTII,S$GLB,, | Performed by: PSYCHIATRY & NEUROLOGY

## 2025-04-07 PROCEDURE — 3008F BODY MASS INDEX DOCD: CPT | Mod: CPTII,S$GLB,, | Performed by: PSYCHIATRY & NEUROLOGY

## 2025-04-07 RX ORDER — METHYLPREDNISOLONE 4 MG/1
TABLET ORAL
COMMUNITY
Start: 2025-03-10

## 2025-04-07 RX ORDER — AMOXICILLIN 500 MG/1
500 CAPSULE ORAL EVERY 8 HOURS
COMMUNITY
Start: 2025-03-10

## 2025-04-07 RX ORDER — HYDROCODONE BITARTRATE AND ACETAMINOPHEN 5; 325 MG/1; MG/1
1 TABLET ORAL EVERY 6 HOURS PRN
COMMUNITY
Start: 2025-03-10

## 2025-04-07 RX ORDER — DIAZEPAM 5 MG/1
5 TABLET ORAL EVERY 12 HOURS PRN
Qty: 60 TABLET | Refills: 0 | Status: SHIPPED | OUTPATIENT
Start: 2025-04-07 | End: 2025-05-08

## 2025-04-11 ENCOUNTER — CLINICAL SUPPORT (OUTPATIENT)
Dept: REHABILITATION | Facility: HOSPITAL | Age: 20
End: 2025-04-11
Payer: COMMERCIAL

## 2025-04-11 DIAGNOSIS — M54.9 DORSALGIA: Primary | ICD-10-CM

## 2025-04-11 PROCEDURE — 97530 THERAPEUTIC ACTIVITIES: CPT | Performed by: PHYSICAL THERAPIST

## 2025-04-11 PROCEDURE — 97110 THERAPEUTIC EXERCISES: CPT | Performed by: PHYSICAL THERAPIST

## 2025-04-11 PROCEDURE — 97112 NEUROMUSCULAR REEDUCATION: CPT | Performed by: PHYSICAL THERAPIST

## 2025-04-11 NOTE — PROGRESS NOTES
"  Outpatient Rehab    Physical Therapy Visit    Patient Name: Nahum Alfaro  MRN: [id number]  YOB: 2005  Encounter Date: 4/11/2025    Therapy Diagnosis:   Encounter Diagnosis   Name Primary?    Dorsalgia Yes     Physician: Preeti Bauman NP    Physician Orders: Eval and Treat  Medical Diagnosis: Dorsalgia    Visit # / Visits Authorized:  1 / 10 (+eval)  Insurance Authorization Period: 4/4/2025 to 12/31/2025  Date of Evaluation: 4/4/2025  Plan of Care Certification: 7/3/2025     PT/PTA:     Number of PTA visits since last PT visit:   Time In: 0733   Time Out: 0835  Total Time: 62   Total Billable Time:  56    FOTO:  Intake Score:  %  Survey Score 1:  %  Survey Score 2:  %         Subjective   Patient reports that he has seen his neurologist since his eval. They put him on lifting restrictions of no more than 40lbs, and he is scheduled still to see the movement specialist..  Pain reported as 0/10.      Objective            Treatment:     CPT Intervention Performed   Today Duration / Intensity   MT         TE Recumbent Bike x 6', level PRN     Open Books x 15x each side     Modified child's pose standing at II bars x Attempted but stopped after a few repetitions due to increased pain     Child's pose x 10x 5" holds     Ball Roll outs x 10x, 5" holds forward and to each side   NMR Sidelying clams x 3 x 10 each LE     Standing Open Books x 10x each side     Knee extension/flexion machine x Attempted but, during set up, patient was unable to remain upright due to stiffness, and activity was stopped to avoid compensation      TA Abdominal bracing x 3 x 10, 3" holds     Pelvic Tilts x 3 x 10, 3" holds     Bridges - small range x 3 x 10     Standing Hip 3-way x 20x each side     Leg Press  x 3 x 10, 105lbs     Heel/toe raises x 3 x 10                       PLAN Side stepping, squat taps, dead lift/hip hinge form, sled, box lifts             CPT Codes available for Billing:   (00) minutes of Manual therapy " (MT) to improve pain and ROM.  (20) minutes of Therapeutic Exercise (TE) to develop strength, endurance, range of motion, and flexibility.  (12) minutes of Neuromuscular Re-Education (NMR)  to improve: Balance, Coordination, Kinesthetic, Sense, Proprioception, and Posture.  (24) minutes of Therapeutic Activities (TA) to improve functional performance.       Assessment & Plan   Assessment: Patient tolerated treatment well. He completed exercises with minimal difficulty and complaint. Frequent cues required for proper form throughout exercises. Attempted knee extension/flexion machine, but patient was unable to maintain upright position so activity was stopped to avoid compensation. Patient left with reports of feeling pretty good overall and about the same as upon arrival, with only minimal overall discomfort in back. Treatment session today focused on hip strengthening and core stabilization, as well as thoracic and lumbar mobility per tolerance.       Patient will continue to benefit from skilled outpatient physical therapy to address the deficits listed in the problem list box on initial evaluation, provide pt/family education and to maximize pt's level of independence in the home and community environment.     Patient's spiritual, cultural, and educational needs considered and patient agreeable to plan of care and goals.           Plan: Continue Plan of Care (POC) and progress per patient tolerance. See treatment section for details on planned progressions next session.    Goals:   Active       Long Term Goals       Pt will report worst pain of 4/10 in order to progress toward max functional ability and improve quality of life                Start:  04/04/25    Expected End:  06/27/25            Patient will improve strength to at least 4+/5 grossly,  in order to improve functional independence and quality of life.         Start:  04/04/25    Expected End:  06/27/25            Patient will demonstrate improved  function as indicated by a score of greater than or equal to 65 out of 100 on FOTO.                Start:  04/04/25    Expected End:  06/27/25            Patient will be able to demonstrate improved body mechanics during lifting and report decreased pain levels.       Start:  04/04/25    Expected End:  06/27/25               Short Term Goals       Pt will report worst pain of 8/10 in order to progress toward max functional ability and improve quality of life                Start:  04/04/25    Expected End:  05/16/25            Patient will improve strength by 1/2 a grade, in order to progress towards independence with functional activities.                Start:  04/04/25    Expected End:  05/16/25            Patient will correct postural deviations in sitting and standing, to decrease pain and promote long term stability.                 Start:  04/04/25    Expected End:  05/16/25            Patient will demonstrate independence with HEP in order to progress toward functional independence.        Start:  04/04/25    Expected End:  05/16/25                Elly Lozano, PT, DPT

## 2025-04-25 ENCOUNTER — CLINICAL SUPPORT (OUTPATIENT)
Dept: REHABILITATION | Facility: HOSPITAL | Age: 20
End: 2025-04-25
Payer: COMMERCIAL

## 2025-04-25 DIAGNOSIS — M54.9 DORSALGIA: Primary | ICD-10-CM

## 2025-04-25 PROCEDURE — 97112 NEUROMUSCULAR REEDUCATION: CPT | Performed by: PHYSICAL THERAPIST

## 2025-04-25 PROCEDURE — 97530 THERAPEUTIC ACTIVITIES: CPT | Performed by: PHYSICAL THERAPIST

## 2025-04-25 PROCEDURE — 97110 THERAPEUTIC EXERCISES: CPT | Performed by: PHYSICAL THERAPIST
